# Patient Record
Sex: FEMALE | Race: ASIAN | NOT HISPANIC OR LATINO | Employment: FULL TIME | URBAN - METROPOLITAN AREA
[De-identification: names, ages, dates, MRNs, and addresses within clinical notes are randomized per-mention and may not be internally consistent; named-entity substitution may affect disease eponyms.]

---

## 2017-06-09 ENCOUNTER — HOSPITAL ENCOUNTER (OUTPATIENT)
Dept: RADIOLOGY | Facility: MEDICAL CENTER | Age: 42
End: 2017-06-09
Attending: FAMILY MEDICINE
Payer: COMMERCIAL

## 2017-06-09 DIAGNOSIS — Z12.39 SCREENING BREAST EXAMINATION: ICD-10-CM

## 2017-06-09 PROCEDURE — 74022 RADEX COMPL AQT ABD SERIES: CPT

## 2017-09-28 ENCOUNTER — NON-PROVIDER VISIT (OUTPATIENT)
Dept: OCCUPATIONAL MEDICINE | Facility: CLINIC | Age: 42
End: 2017-09-28

## 2017-09-28 DIAGNOSIS — Z02.89 ENCOUNTER FOR OCCUPATIONAL HEALTH EXAMINATION: ICD-10-CM

## 2017-09-28 DIAGNOSIS — Z02.1 PRE-EMPLOYMENT DRUG SCREENING: ICD-10-CM

## 2017-09-28 LAB
AMP AMPHETAMINE: NORMAL
COC COCAINE: NORMAL
INT CON NEG: NORMAL
INT CON POS: NORMAL
MET METHAMPHETAMINES: NORMAL
OPI OPIATES: NORMAL
PCP PHENCYCLIDINE: NORMAL
POC DRUG COMMENT 753798-OCCUPATIONAL HEALTH: NEGATIVE
THC: NORMAL

## 2017-09-28 PROCEDURE — 80305 DRUG TEST PRSMV DIR OPT OBS: CPT | Performed by: PREVENTIVE MEDICINE

## 2018-02-08 ENCOUNTER — GYNECOLOGY VISIT (OUTPATIENT)
Dept: OBGYN | Facility: MEDICAL CENTER | Age: 43
End: 2018-02-08
Payer: COMMERCIAL

## 2018-02-08 VITALS
SYSTOLIC BLOOD PRESSURE: 120 MMHG | DIASTOLIC BLOOD PRESSURE: 76 MMHG | HEIGHT: 60 IN | BODY MASS INDEX: 22.19 KG/M2 | WEIGHT: 113 LBS

## 2018-02-08 DIAGNOSIS — N93.9 ABNORMAL UTERINE BLEEDING (AUB): ICD-10-CM

## 2018-02-08 PROCEDURE — 99203 OFFICE O/P NEW LOW 30 MIN: CPT | Performed by: OBSTETRICS & GYNECOLOGY

## 2018-02-08 NOTE — PROGRESS NOTES
Chief Complaint   Patient presents with   • Other     Discuss irregular periods.        History of present illness: 42 y.o. presents with above chief complaint. Location:uterus/vaginal bleeding; duration:3-4 months; quality: BRB; severity: moderate,  Timing: off and on but started 10/20/17 for 30 days followed by every 2 weeks in Nov. She had 1 day of menses in December but then started bleeding every day since 1/24/18 and has not stopped; aggravating factors: unknown, alleviating factors:none. Pt also has no other complaints. States she is not tired, having pain, affecting her daily activities except to change pads. She has no FH of female cancers.    Review of systems:  Pertinent positives documented in HPI and a comprehensive review of system is negative as follows:    Constitutional ROS: No unexpected change in weight, No weakness, No fatigue, No unexplained fevers, sweats, or chills  Mouth/Throat ROS: No bleeding gums, No sore throat, No recent change in voice or hoarseness  Neck ROS: No lumps or masses, No swollen glands, No significant pain in neck  Pulmonary ROS: No chronic cough, sputum, or hemoptysis, No dyspnea on exertion, No wheezing, No shortness of breath, No recent change in breathing  Cardiovascular ROS: No exercise intolerance, No chest pain, No shortness of breath, No palpitations, No syncope  Genitourinary ROS: Negative for dysuria, frequency and incontinence  Gastrointestinal ROS: No abdominal pain, No change in bowel habits, No significant change in appetite, No nausea, vomiting, diarrhea, or constipation, No hematemesis, No abdominal bloating or early satiety  Breast ROS: No new breast lumps or masses, No severe breast pain, No nipple discharge  Musculoskeletal/Extremities ROS: No cyanosis, No peripheral edema, No pain, redness or swelling on the joints  Hematologic/Lymphatic ROS: No anemia,  No chills, No bruising, No weight loss  Skin/Integumentary ROS: No evidence of bleeding or bruising,  No abnormal skin lesions noted, No evidence of rash, No itching  Neurologic ROS: No chronic headaches, No seizures, No weakness  Psychiatric ROS: No depression, No anxiety, No psychosis    All PMH, PSH, allergies, social history and FH reviewed and updated today:  History reviewed. No pertinent past medical history.    Past Surgical History:   Procedure Laterality Date   • TUBAL COAGULATION LAPAROSCOPIC BILATERAL  2003 lb        Allergies: No Known Allergies    Social History     Social History   • Marital status:      Spouse name: N/A   • Number of children: N/A   • Years of education: N/A     Occupational History   • Not on file.     Social History Main Topics   • Smoking status: Never Smoker   • Smokeless tobacco: Never Used   • Alcohol use No   • Drug use: No   • Sexual activity: Yes     Partners: Male     Other Topics Concern   • Not on file     Social History Narrative   • No narrative on file       History reviewed. No pertinent family history.    Physical exam:  Blood pressure 120/76, height 1.524 m (5'), weight 51.3 kg (113 lb), last menstrual period 01/24/2018.    GENERAL APPEARANCE: healthy, alert, no distress, cooperative, smiling  NECK nontender, no masses, thyromegaly or nodules  HEART RRR with normal S1 and S2 ,no murmurs, no gallops, no peripheral edema  LUNG clear to auscultation, normal respiratory effort  ABDOMEN Abdomen soft, non-tender. BS normal. No masses,  No organomegaly  FEMALE GYN: normal female external genitalia without lesions, bloody vaginal discharge noted, vulva pink without erythema or friability, urethra is normal without discharge or scarring, no bladder fullness or masses, normal vagina and normal vaginal tone, normal cervix, normal adnexa without tenderness, mildly bulky uterus, normal anus and perineum.  No inguinal hernia present.  EXTREMITIES:negative clubbing, cyanosis, edema    NEURO Awake, alert and oriented x 3, Normal gait, no sensory deficits  SKIN No rashes, or  ulcers or lesions seen  PSYCHIATRIC: Patient shows appropriate affect, is alert and oriented x3, intact judgment and insight.        1. Abnormal uterine bleeding (AUB)  REFERRAL TO OB/GYN    US-GYN-PELVIS TRANSVAGINAL    TSH+PRL+FSH+TESTT+LH+T4F+DH     Discussed possible etiology of bleeding including perimenopause, fibroids and possible malignancy. Will obtain labs and US. Schedule for EMB. Referrals placed. RTO in 3 weeks for EMB.

## 2018-02-10 LAB
DHEA-S SERPL-MCNC: 164.4 UG/DL (ref 57.3–279.2)
FSH SERPL-ACNC: 25.1 MIU/ML
LH SERPL-ACNC: 8.5 MIU/ML
PROLACTIN SERPL-MCNC: 8.8 NG/ML (ref 4.8–23.3)
T4 FREE SERPL-MCNC: 1.13 NG/DL (ref 0.82–1.77)
TESTOST FREE SERPL-MCNC: 0.6 PG/ML (ref 0–4.2)
TESTOST SERPL-MCNC: 7 NG/DL (ref 8–48)
TSH SERPL DL<=0.005 MIU/L-ACNC: 1.3 UIU/ML (ref 0.45–4.5)

## 2018-02-17 ENCOUNTER — HOSPITAL ENCOUNTER (OUTPATIENT)
Dept: RADIOLOGY | Facility: MEDICAL CENTER | Age: 43
End: 2018-02-17
Attending: OBSTETRICS & GYNECOLOGY
Payer: COMMERCIAL

## 2018-02-17 DIAGNOSIS — N93.9 ABNORMAL UTERINE BLEEDING (AUB): ICD-10-CM

## 2018-02-17 PROCEDURE — 76830 TRANSVAGINAL US NON-OB: CPT

## 2018-03-02 ENCOUNTER — HOSPITAL ENCOUNTER (OUTPATIENT)
Facility: MEDICAL CENTER | Age: 43
End: 2018-03-02
Attending: OBSTETRICS & GYNECOLOGY
Payer: COMMERCIAL

## 2018-03-02 ENCOUNTER — GYNECOLOGY VISIT (OUTPATIENT)
Dept: OBGYN | Facility: MEDICAL CENTER | Age: 43
End: 2018-03-02
Payer: COMMERCIAL

## 2018-03-02 VITALS
SYSTOLIC BLOOD PRESSURE: 132 MMHG | DIASTOLIC BLOOD PRESSURE: 70 MMHG | WEIGHT: 116 LBS | BODY MASS INDEX: 22.78 KG/M2 | HEIGHT: 60 IN

## 2018-03-02 DIAGNOSIS — N93.9 ABNORMAL UTERINE BLEEDING (AUB): ICD-10-CM

## 2018-03-02 DIAGNOSIS — Z01.419 WELL WOMAN EXAM WITH ROUTINE GYNECOLOGICAL EXAM: ICD-10-CM

## 2018-03-02 DIAGNOSIS — Z12.4 SCREENING FOR CERVICAL CANCER: ICD-10-CM

## 2018-03-02 DIAGNOSIS — D25.0 FIBROIDS, SUBMUCOSAL: ICD-10-CM

## 2018-03-02 DIAGNOSIS — Z12.39 SCREENING FOR BREAST CANCER: ICD-10-CM

## 2018-03-02 DIAGNOSIS — Z11.51 ENCOUNTER FOR SCREENING FOR HUMAN PAPILLOMAVIRUS (HPV): ICD-10-CM

## 2018-03-02 PROCEDURE — 88175 CYTOPATH C/V AUTO FLUID REDO: CPT

## 2018-03-02 PROCEDURE — 58100 BIOPSY OF UTERUS LINING: CPT | Performed by: OBSTETRICS & GYNECOLOGY

## 2018-03-02 PROCEDURE — 88305 TISSUE EXAM BY PATHOLOGIST: CPT

## 2018-03-02 PROCEDURE — 87624 HPV HI-RISK TYP POOLED RSLT: CPT

## 2018-03-02 PROCEDURE — 99396 PREV VISIT EST AGE 40-64: CPT | Mod: 25 | Performed by: OBSTETRICS & GYNECOLOGY

## 2018-03-02 NOTE — PROGRESS NOTES
Yeni Ozuna is a 42 y.o. y.o. female who presents for her Gynecologic Exam        HPI Comments: Pt presents for well woman exam. Pt had complaints of AUB but states has now stopped. Will still proceed with EMB. Patient's last menstrual period was 01/24/2018.  .  Review of Systems   Pertinent positives documented in HPI and all other systems reviewed & are negative    All PMH, PSH, allergies, social history and FH reviewed and updated today:  History reviewed. No pertinent past medical history.  Past Surgical History:   Procedure Laterality Date   • TUBAL COAGULATION LAPAROSCOPIC BILATERAL  2003 lb      Patient has no known allergies.  Social History     Social History   • Marital status:      Spouse name: N/A   • Number of children: N/A   • Years of education: N/A     Social History Main Topics   • Smoking status: Never Smoker   • Smokeless tobacco: Never Used   • Alcohol use No   • Drug use: No   • Sexual activity: Yes     Partners: Male     Other Topics Concern   • Not on file     Social History Narrative   • No narrative on file     History reviewed. No pertinent family history.  Medications:   No current [facility]-ordered outpatient prescriptions on file.     No current [facility]-ordered facility-administered medications on file.           Objective:   Vital measurements:  Blood pressure 132/70, height 1.524 m (5'), weight 52.6 kg (116 lb), last menstrual period 01/24/2018.  Body mass index is 22.65 kg/m². (Goal BM I>18 <25)    Physical Exam   Nursing note and vitals reviewed.  Constitutional: She is oriented to person, place, and time. She appears well-developed and well-nourished. No distress.     HEENT:   Head: Normocephalic and atraumatic.   Right Ear: External ear normal.   Left Ear: External ear normal.   Nose: Nose normal.   Eyes: Conjunctivae and EOM are normal. Pupils are equal, round, and reactive to light. No scleral icterus.     Neck: Normal range of motion. Neck supple. No tracheal deviation  present. No thyromegaly present.     Pulmonary/Chest: Effort normal and breath sounds normal. No respiratory distress. She has no wheezes. She has no rales. She exhibits no tenderness.     Cardiovascular: Regular, rate and rhythm. No JVD.    Abdominal: Soft. Bowel sounds are normal. She exhibits no distension and no mass. No tenderness. She has no rebound and no guarding.     Breast:  Deferred per pt. States just performed Mammo at [facility] and was negative    Genitourinary:  Pelvic exam was performed with patient supine.  External genitalia with no abnormal pigmentation, labial fusion,rash, tenderness, lesion or injury to the labia bilaterally.  Vagina is moist with no lesions, foul discharge, erythema, tenderness or bleeding. No foreign body around the vagina or signs of injury.   Cervix exhibits no motion tenderness, no discharge and no friability.   Uterus is AV not deviated, not enlarged, not fixed and not tender.  Right adnexum displays no mass, no tenderness and no fullness. Left adnexum displays no mass, no tenderness and no fullness.     Musculoskeletal: Normal range of motion. She exhibits no edema and no tenderness.     Lymphadenopathy: She has no cervical adenopathy.     Neurological: She is alert and oriented to person, place, and time. She exhibits normal muscle tone.     Skin: Skin is warm and dry. No rash noted. She is not diaphoretic. No erythema. No pallor.     Psychiatric: She has a normal mood and affect. Her behavior is normal. Judgment and thought content normal.     Endometrial biopsy    Prior to beginning the procedure, risk and benefits of an endometrial biopsy were discussed including the risk of infection, uterine perforation, and pain. Patient understands the risks associated with endometrial biopsies and consents have been signed to the chart.    Procedure:    The cervix was cleansed with Betadine x3. The uterus was sounded to 7 cm. The single-tooth tenaculum was placed at the 12:00 position.  The endometrial Pipelle was advanced into the uterine cavity without any complications x 2. This sample was sent to pathology. At the site of the tenaculum placement, silver nitrate was used for hemostasis. The patient tolerated the procedure well.            Assessment:     1. Abnormal uterine bleeding (AUB)  Consent for Surgery / Procedure    POCT Pregnancy    PATHOLOGY SPECIMEN   2. Well woman exam with routine gynecological exam  MA-SCREEN MAMMO W/CAD-BILAT    THINPREP PAP WITH HPV   3. Screening for cervical cancer  THINPREP PAP WITH HPV   4. Encounter for screening for human papillomavirus (HPV)  THINPREP PAP WITH HPV   5. Screening for breast cancer  MA-SCREEN MAMMO W/CAD-BILAT   6. Fibroids, submucosal           Plan:   Pap and physical exam performed  Monthly SBE.  Counseling: breast self exam, mammography screening, use and side effects of OCPs, use and side effects of HRT and menopause  Encourage exercise and proper diet.  Mammograms starting @ age 40 annually.  See medications and orders placed in encounter report.    Discussed medical and surgical options for AUB with submucosal fibroid. Pt currently not bleeding but if should start again, will call the office and be started on OCPs. Risks and benefits discussed. EMB performed without problems.

## 2018-03-03 LAB
CYTOLOGY REG CYTOL: NORMAL
HPV HR 12 DNA CVX QL NAA+PROBE: NEGATIVE
HPV16 DNA SPEC QL NAA+PROBE: NEGATIVE
HPV18 DNA SPEC QL NAA+PROBE: NEGATIVE
SPECIMEN SOURCE: NORMAL

## 2018-04-05 ENCOUNTER — OFFICE VISIT (OUTPATIENT)
Dept: MEDICAL GROUP | Facility: MEDICAL CENTER | Age: 43
End: 2018-04-05
Payer: COMMERCIAL

## 2018-04-05 VITALS
TEMPERATURE: 99.3 F | HEIGHT: 63 IN | DIASTOLIC BLOOD PRESSURE: 68 MMHG | HEART RATE: 72 BPM | OXYGEN SATURATION: 98 % | WEIGHT: 113 LBS | SYSTOLIC BLOOD PRESSURE: 108 MMHG | BODY MASS INDEX: 20.02 KG/M2

## 2018-04-05 DIAGNOSIS — K44.9 HIATAL HERNIA: ICD-10-CM

## 2018-04-05 DIAGNOSIS — E55.9 VITAMIN D DEFICIENCY: ICD-10-CM

## 2018-04-05 DIAGNOSIS — E78.2 MIXED HYPERLIPIDEMIA: ICD-10-CM

## 2018-04-05 DIAGNOSIS — Z23 NEED FOR VACCINATION: ICD-10-CM

## 2018-04-05 DIAGNOSIS — Z76.89 ENCOUNTER TO ESTABLISH CARE WITH NEW DOCTOR: ICD-10-CM

## 2018-04-05 DIAGNOSIS — N93.8 DYSFUNCTIONAL UTERINE BLEEDING: ICD-10-CM

## 2018-04-05 DIAGNOSIS — I10 ESSENTIAL HYPERTENSION: ICD-10-CM

## 2018-04-05 DIAGNOSIS — D50.9 MICROCYTIC HYPOCHROMIC ANEMIA: ICD-10-CM

## 2018-04-05 PROBLEM — Z86.2 HISTORY OF ANEMIA: Status: ACTIVE | Noted: 2018-04-05

## 2018-04-05 PROBLEM — E78.1 HYPERTRIGLYCERIDEMIA: Status: ACTIVE | Noted: 2018-04-05

## 2018-04-05 PROCEDURE — 90471 IMMUNIZATION ADMIN: CPT | Performed by: FAMILY MEDICINE

## 2018-04-05 PROCEDURE — 99204 OFFICE O/P NEW MOD 45 MIN: CPT | Mod: 25 | Performed by: FAMILY MEDICINE

## 2018-04-05 PROCEDURE — 90715 TDAP VACCINE 7 YRS/> IM: CPT | Performed by: FAMILY MEDICINE

## 2018-04-05 RX ORDER — ATORVASTATIN CALCIUM 10 MG/1
TABLET, FILM COATED ORAL
COMMUNITY
Start: 2018-02-16 | End: 2018-04-05 | Stop reason: SDUPTHER

## 2018-04-05 RX ORDER — ERGOCALCIFEROL 1.25 MG/1
CAPSULE ORAL
COMMUNITY
Start: 2018-02-21 | End: 2018-04-05

## 2018-04-05 RX ORDER — LISINOPRIL 10 MG/1
TABLET ORAL
COMMUNITY
Start: 2018-02-27 | End: 2018-04-05 | Stop reason: SDUPTHER

## 2018-04-05 RX ORDER — OMEPRAZOLE 20 MG/1
20 CAPSULE, DELAYED RELEASE ORAL 2 TIMES DAILY
Qty: 180 CAP | Refills: 0 | Status: SHIPPED | OUTPATIENT
Start: 2018-04-05 | End: 2018-07-09 | Stop reason: SDUPTHER

## 2018-04-05 RX ORDER — CHOLECALCIFEROL (VITAMIN D3) 125 MCG
2 CAPSULE ORAL DAILY
Qty: 90 TAB | Refills: 1 | Status: SHIPPED | OUTPATIENT
Start: 2018-04-05 | End: 2018-07-09 | Stop reason: SDUPTHER

## 2018-04-05 RX ORDER — LISINOPRIL 10 MG/1
10 TABLET ORAL DAILY
Qty: 90 TAB | Refills: 0 | Status: SHIPPED | OUTPATIENT
Start: 2018-04-05 | End: 2018-10-11

## 2018-04-05 RX ORDER — ATORVASTATIN CALCIUM 10 MG/1
10 TABLET, FILM COATED ORAL DAILY
Qty: 90 TAB | Refills: 0 | Status: SHIPPED | OUTPATIENT
Start: 2018-04-05 | End: 2019-04-11 | Stop reason: SDUPTHER

## 2018-04-05 ASSESSMENT — PATIENT HEALTH QUESTIONNAIRE - PHQ9: CLINICAL INTERPRETATION OF PHQ2 SCORE: 0

## 2018-04-15 NOTE — ASSESSMENT & PLAN NOTE
Chronic, stable, well controlled. It is unclear how patient is taking her blood pressure medication. Apparently, she was told by her former PCP takes blood pressure medication (lisinopril) sees on an as-needed basis.    No signs or symptoms of angioedema present time O (this swelling, throat closure, wheezing, respiratory distress.    ROS is NEGATIVE for dizziness, generalized weakness/fatigue, cold sweats, dizziness,  vision/hearing changes, jaw pain/paresthesias, BUE pain/paresthesias/numbness/weakness, chest pain/pressure, palpitations, dyspnea, nausea, RUQ abdominal pain, oliguria/anuria, BLE edema.

## 2018-04-15 NOTE — PROGRESS NOTES
Subjective:   Chief Complaint/History of Present Illness:  Yeni Glasgow is a 42 y.o. female patient new to [facility] who presents today to establish primary medical care and to discuss the following medical conditions.      Diagnoses of Encounter to establish care with new doctor, Microcytic hypochromic anemia, Dysfunctional uterine bleeding, Essential hypertension, Mixed hyperlipidemia, Hiatal hernia, Vitamin D deficiency, and Need for vaccination were pertinent to this visit.    PMH, PSH, Social History, Medications, Allergies, FMH all reviewed as documented:    Microcytic hypochromic anemia  Acute exacerbation of chronic condition. Review of records from February 2018 from lab core reveals that patient was diagnosed with microcytic hypochromic anemia.    Patient has had appropriate follow-up with gynecologist, Dr. Barksdale, with both Pap smear (negative for intraepithelial lesions, negative for HPV testing) and endometrial biopsy (positive for: Disordered proliferative endometrium with focal tubal metaplasia).    Dysfunctional uterine bleeding has since stopped, and patient does not feel symptomatic anymore.    ROS is NEGATIVE for generalized weakness/fatigue, generalized pallor, dizziness, lightheadedness, blurred vision, chest pain/pressure, palpitations, tachycardia, dyspnea, hematemesis, hemoptysis, diarrhea, hematochezia, melena, hematuria, dysfunctional vaginal bleeding, hand and foot tingling.    Dysfunctional uterine bleeding  Chronic, uncontrolled, please see notes from same date of service 4/5/2018 Re: Microcytic hyperchromic anemia.    Essential hypertension  Chronic, stable, well controlled. It is unclear how patient is taking her blood pressure medication. Apparently, she was told by her former PCP takes blood pressure medication (lisinopril) sees on an as-needed basis.    No signs or symptoms of angioedema present time O (this swelling, throat closure, wheezing, respiratory distress.    ROS is  NEGATIVE for dizziness, generalized weakness/fatigue, cold sweats, dizziness,  vision/hearing changes, jaw pain/paresthesias, BUE pain/paresthesias/numbness/weakness, chest pain/pressure, palpitations, dyspnea, nausea, RUQ abdominal pain, oliguria/anuria, BLE edema.    Mixed hyperlipidemia  Patient reports having history of mixed hyperlipidemia (see last labs from 2010 as below). Patient is currently taking atorvastatin 10 mg by mouth daily.    ROS negative for myalgias, arthralgias.      Component Value Ref Range & Units Status   Cholesterol,Tot 260   100 - 199 mg/dL Final   Triglycerides 163   0 - 149 mg/dL Final   HDL 61  >39 mg/dL Final   Comment:   According to ATP-III Guidelines, HDL-C >59 mg/dL is considered a  negative risk factor for CHD.   VLDL Cholesterol Calc 33  5 - 40 mg/dL Final      0 - 99 mg/dL Final       Hiatal hernia  Patient reported hiatal hernia, states that this is causing her to have esophageal reflux symptoms. The symptoms are generally well-controlled with Prilosec as she is taking on a regular basis.    ROS negative for fevers, chills, nausea, emesis, hematemesis, hematochezia, melena.    Vitamin D deficiency  Patient reports have history of vitamin D deficiency. She is currently taking vitamin D as listed. Patient without pathologic fractures nor falls risk.      Patient Active Problem List    Diagnosis Date Noted   • Microcytic hypochromic anemia 04/05/2018   • Mixed hyperlipidemia 04/05/2018   • Vitamin D deficiency 04/05/2018   • Dysfunctional uterine bleeding 04/05/2018   • Hiatal hernia 04/05/2018   • Essential hypertension 04/05/2018       Additional History:   Allergies:    Patient has no known allergies.     Medications:     Current Outpatient Prescriptions Ordered in [facility]   Medication Sig Dispense Refill   • Misc Natural Products (FOCUSED MIND PO) Take  by mouth.     • lisinopril (PRINIVIL) 10 MG Tab Take 1 Tab by mouth every day. 90 Tab 0   • atorvastatin (LIPITOR) 10 MG  "Tab Take 1 Tab by mouth every day. 90 Tab 0   • Cholecalciferol (VITAMIN D3) 2000 units Tab Take 2 tablet by mouth every day for 180 days. 90 Tab 1   • omeprazole (PRILOSEC) 20 MG delayed-release capsule Take 1 Cap by mouth 2 times a day. 180 Cap 0     No current Epic-ordered facility-administered medications on file.         Past Medical History:   History reviewed. No pertinent past medical history.     Past Surgical History:     Past Surgical History:   Procedure Laterality Date   • TUBAL COAGULATION LAPAROSCOPIC BILATERAL  2003 lb         Social History:     Social History   Substance Use Topics   • Smoking status: Never Smoker   • Smokeless tobacco: Never Used   • Alcohol use No        Family History:     Family Status   Relation Status   • Mother Alive   • Father Alive   • Sister Alive   • Brother Alive   • Brother Alive   • Sister Alive      History reviewed. No pertinent family history.    ROS:     - Constitutional: Negative for fever, chills, unexpected weight change, and fatigue/generalized weakness.     - Respiratory: Negative for cough, sputum production, chest congestion, dyspnea, wheezing, and crackles.      - Cardiovascular: Negative for chest pain, palpitations, orthopnea, and bilateral lower extremity edema.     - NOTE: All other systems reviewed and are negative, except as in HPI.     Objective:   Physical Exam:    Vitals: Blood pressure 108/68, pulse 72, temperature 37.4 °C (99.3 °F), height 1.6 m (5' 3\"), weight 51.3 kg (113 lb), last menstrual period 03/14/2018, SpO2 98 %, not currently breastfeeding.   BMI: Body mass index is 20.02 kg/m².   General/Constitutional: Vitals as above, Well nourished, well developed female in no acute distress   Head/Eyes:  - Head is grossly normal & atraumatic  - Bilateral conjunctivae clear and not injected, bilateral EOMI, bilateral PERRL   ENT:   - Bilateral external ears grossly normal in appearance, external auditory canals clear & bilateral TMs visualized " with appropriate cone of light reflex, hearing grossly intact  - External nares normal in appearance and without discharge/bleeding, bilateral turbinates non-erythematous/non-edematous and without discharge/bleeding  - Good dentition posterior oropharynx without erythema/edema/exudates  Neck: Neck supple, no masses, neck non-tender to palpation, no thyromegaly/goiter   Respiratory: No respiratory distress, bilateral lungs are clear to auscultation in all lung fields (anterior/lateral/posterior), no wheezing/rhonchi/rales   Cardiovascular: Regular rate and rhythm without murmur/gallops/rubs, carotid bruits present, distal pulses equal and 2+ bilaterally (radial, posterior tibial), no bilateral lower extremity edema   Gastrointestinal: Abdominal scars present, Abdomen resonant to percussion, Bowel sounds present in all 4 quadrants, abdomen mildly tender to light and deep palpation    MSK: Gait grossly normal & not antalgic, no tenderness to percussion of vertebral processes, no CVAT, no bilateral SI joint tenderness   Integumentary: No apparent rashes   Neuro: Gross motor movement intact in all 4 extremities, Gross sensation intact to extremities and trunk, Gait grossly normal and not antalgic   Psych: Judgment grossly appropriate, no apparent depression/anxiety    Health Maintenance:     - I have requested previous records (Dr. Giovanny Greer), and will update accordingly.    Imaging/Labs:     - I have requested previous records (DR. Giovanny Greer), and will update accordingly.    Assessment and Plan:   1. Encounter to establish care with new doctor  Patient establishing primary medical care with me, transferring from Dr. Giovanny Greer    2. Microcytic hypochromic anemia  3. Dysfunctional uterine bleeding  Chronic, controlled, currently asymptomatic. Patient's workup was negative for malignancy or dysplasia/metaplasia that was significant. We will continue to track her CBC along with iron levels, and patient is advised to  follow-up with gynecologist ASAP for further discussion/consultation/management.   - CBC WITH DIFFERENTIAL; Future   - FERRITIN; Future   - IRON/TOTAL IRON BIND; Future    4. Essential hypertension  Chronic, stable, controlled. Patient advised to continue lisinopril and daily basis, however to stop if she is having hypertensive symptoms.   - lisinopril (PRINIVIL) 10 MG Tab; Take 1 Tab by mouth every day.  Dispense: 90 Tab; Refill: 0    5. Mixed hyperlipidemia  Chronic, unknown control, evaluate his lipid profile, but continue atorvastatin at current dosage.   - LIPID PROFILE; Future   - atorvastatin (LIPITOR) 10 MG Tab; Take 1 Tab by mouth every day.  Dispense: 90 Tab; Refill: 0    6. Hiatal hernia  Chronic, stable, well controlled on current medication.   - omeprazole (PRILOSEC) 20 MG delayed-release capsule; Take 1 Cap by mouth 2 times a day.  Dispense: 180 Cap; Refill: 0    7. Vitamin D deficiency  Chronic, unknown control, evaluate vitamin D lab, continue vitamin D supplementation as directed.   - VITAMIN D,25 HYDROXY; Future   - Cholecalciferol (VITAMIN D3) 2000 units Tab; Take 2 tablet by mouth every day for 180 days.  Dispense: 90 Tab; Refill: 1    8. Need for vaccination  Uncontrolled, addressed with vaccine as ordered below.   - TDAP VACCINE =>8YO IM    RTC: In 3-6 months for annual medical exam, review of labs and records.    PLEASE NOTE: This dictation was created using voice recognition software. I have made every reasonable attempt to correct obvious errors, but I expect that there are errors of grammar and possibly content that I did not discover before finalizing the note.

## 2018-04-15 NOTE — ASSESSMENT & PLAN NOTE
Patient reports have history of vitamin D deficiency. She is currently taking vitamin D as listed. Patient without pathologic fractures nor falls risk.

## 2018-04-15 NOTE — ASSESSMENT & PLAN NOTE
Acute exacerbation of chronic condition. Review of records from February 2018 from lab core reveals that patient was diagnosed with microcytic hypochromic anemia.    Patient has had appropriate follow-up with gynecologist, Dr. Barksdale, with both Pap smear (negative for intraepithelial lesions, negative for HPV testing) and endometrial biopsy (positive for: Disordered proliferative endometrium with focal tubal metaplasia).    Dysfunctional uterine bleeding has since stopped, and patient does not feel symptomatic anymore.    ROS is NEGATIVE for generalized weakness/fatigue, generalized pallor, dizziness, lightheadedness, blurred vision, chest pain/pressure, palpitations, tachycardia, dyspnea, hematemesis, hemoptysis, diarrhea, hematochezia, melena, hematuria, dysfunctional vaginal bleeding, hand and foot tingling.

## 2018-04-15 NOTE — ASSESSMENT & PLAN NOTE
Chronic, uncontrolled, please see notes from same date of service 4/5/2018 Re: Microcytic hyperchromic anemia.

## 2018-04-15 NOTE — ASSESSMENT & PLAN NOTE
Patient reported hiatal hernia, states that this is causing her to have esophageal reflux symptoms. The symptoms are generally well-controlled with Prilosec as she is taking on a regular basis.    ROS negative for fevers, chills, nausea, emesis, hematemesis, hematochezia, melena.

## 2018-04-15 NOTE — ASSESSMENT & PLAN NOTE
Patient reports having history of mixed hyperlipidemia (see last labs from 2010 as below). Patient is currently taking atorvastatin 10 mg by mouth daily.    ROS negative for myalgias, arthralgias.      Component Value Ref Range & Units Status   Cholesterol,Tot 260   100 - 199 mg/dL Final   Triglycerides 163   0 - 149 mg/dL Final   HDL 61  >39 mg/dL Final   Comment:   According to ATP-III Guidelines, HDL-C >59 mg/dL is considered a  negative risk factor for CHD.   VLDL Cholesterol Calc 33  5 - 40 mg/dL Final      0 - 99 mg/dL Final

## 2018-07-09 DIAGNOSIS — K44.9 HIATAL HERNIA: ICD-10-CM

## 2018-07-09 DIAGNOSIS — E55.9 VITAMIN D DEFICIENCY: ICD-10-CM

## 2018-07-09 RX ORDER — OMEPRAZOLE 20 MG/1
20 CAPSULE, DELAYED RELEASE ORAL 2 TIMES DAILY
Qty: 180 CAP | Refills: 0 | Status: SHIPPED | OUTPATIENT
Start: 2018-07-09 | End: 2018-10-14 | Stop reason: SDUPTHER

## 2018-07-09 RX ORDER — CHOLECALCIFEROL (VITAMIN D3) 50 MCG
2000 TABLET ORAL DAILY
Qty: 90 TAB | Refills: 0 | Status: SHIPPED | OUTPATIENT
Start: 2018-07-09 | End: 2019-01-05

## 2018-10-06 LAB
25(OH)D3+25(OH)D2 SERPL-MCNC: 41.5 NG/ML (ref 30–100)
BASOPHILS # BLD AUTO: 0.1 X10E3/UL (ref 0–0.2)
BASOPHILS NFR BLD AUTO: 1 %
CHOLEST SERPL-MCNC: 197 MG/DL (ref 100–199)
EOSINOPHIL # BLD AUTO: 0.3 X10E3/UL (ref 0–0.4)
EOSINOPHIL NFR BLD AUTO: 5 %
ERYTHROCYTE [DISTWIDTH] IN BLOOD BY AUTOMATED COUNT: 13.8 % (ref 12.3–15.4)
FERRITIN SERPL-MCNC: 9 NG/ML (ref 15–150)
HCT VFR BLD AUTO: 37.3 % (ref 34–46.6)
HDLC SERPL-MCNC: 63 MG/DL
HGB BLD-MCNC: 11.7 G/DL (ref 11.1–15.9)
IMM GRANULOCYTES # BLD: 0 X10E3/UL (ref 0–0.1)
IMM GRANULOCYTES NFR BLD: 0 %
IMMATURE CELLS  115398: ABNORMAL
IRON SATN MFR SERPL: 5 % (ref 15–55)
IRON SERPL-MCNC: 21 UG/DL (ref 27–159)
LABORATORY COMMENT REPORT: ABNORMAL
LDLC SERPL CALC-MCNC: 105 MG/DL (ref 0–99)
LYMPHOCYTES # BLD AUTO: 1.6 X10E3/UL (ref 0.7–3.1)
LYMPHOCYTES NFR BLD AUTO: 27 %
MCH RBC QN AUTO: 28.7 PG (ref 26.6–33)
MCHC RBC AUTO-ENTMCNC: 31.4 G/DL (ref 31.5–35.7)
MCV RBC AUTO: 91 FL (ref 79–97)
MONOCYTES # BLD AUTO: 0.4 X10E3/UL (ref 0.1–0.9)
MONOCYTES NFR BLD AUTO: 7 %
MORPHOLOGY BLD-IMP: ABNORMAL
NEUTROPHILS # BLD AUTO: 3.6 X10E3/UL (ref 1.4–7)
NEUTROPHILS NFR BLD AUTO: 60 %
NRBC BLD AUTO-RTO: ABNORMAL %
PLATELET # BLD AUTO: 401 X10E3/UL (ref 150–379)
RBC # BLD AUTO: 4.08 X10E6/UL (ref 3.77–5.28)
TIBC SERPL-MCNC: 435 UG/DL (ref 250–450)
TRIGL SERPL-MCNC: 143 MG/DL (ref 0–149)
UIBC SERPL-MCNC: 414 UG/DL (ref 131–425)
VLDLC SERPL CALC-MCNC: 29 MG/DL (ref 5–40)
WBC # BLD AUTO: 6 X10E3/UL (ref 3.4–10.8)

## 2018-10-11 ENCOUNTER — OFFICE VISIT (OUTPATIENT)
Dept: MEDICAL GROUP | Facility: MEDICAL CENTER | Age: 43
End: 2018-10-11
Payer: COMMERCIAL

## 2018-10-11 VITALS
BODY MASS INDEX: 21.23 KG/M2 | SYSTOLIC BLOOD PRESSURE: 114 MMHG | DIASTOLIC BLOOD PRESSURE: 72 MMHG | TEMPERATURE: 98.5 F | WEIGHT: 119.8 LBS | HEIGHT: 63 IN | OXYGEN SATURATION: 100 % | HEART RATE: 73 BPM

## 2018-10-11 DIAGNOSIS — N93.8 DYSFUNCTIONAL UTERINE BLEEDING: ICD-10-CM

## 2018-10-11 DIAGNOSIS — I10 ESSENTIAL HYPERTENSION: ICD-10-CM

## 2018-10-11 DIAGNOSIS — Z00.00 ANNUAL PHYSICAL EXAM: Primary | ICD-10-CM

## 2018-10-11 DIAGNOSIS — R05.8 ACE-INHIBITOR COUGH: ICD-10-CM

## 2018-10-11 DIAGNOSIS — Z23 NEED FOR INFLUENZA VACCINATION: ICD-10-CM

## 2018-10-11 DIAGNOSIS — T46.4X5A ACE-INHIBITOR COUGH: ICD-10-CM

## 2018-10-11 DIAGNOSIS — E61.1 IRON DEFICIENCY: ICD-10-CM

## 2018-10-11 DIAGNOSIS — D25.1 INTRAMURAL LEIOMYOMA OF UTERUS: ICD-10-CM

## 2018-10-11 PROBLEM — D25.9 UTERINE FIBROID: Status: ACTIVE | Noted: 2018-10-11

## 2018-10-11 PROCEDURE — 99214 OFFICE O/P EST MOD 30 MIN: CPT | Mod: 25 | Performed by: FAMILY MEDICINE

## 2018-10-11 PROCEDURE — 99396 PREV VISIT EST AGE 40-64: CPT | Performed by: FAMILY MEDICINE

## 2018-10-11 RX ORDER — LOSARTAN POTASSIUM 25 MG/1
25 TABLET ORAL DAILY
Qty: 90 TAB | Refills: 0 | Status: SHIPPED | OUTPATIENT
Start: 2018-10-11 | End: 2019-01-18 | Stop reason: SDUPTHER

## 2018-10-13 NOTE — ASSESSMENT & PLAN NOTE
New problem, uncontrolled, please see notes from same day service 10/11/2018 regarding hypertension

## 2018-10-13 NOTE — PROGRESS NOTES
Subjective:   Chief Complaint/History of Present Illness:  Yeni Glasgow is a 43 y.o. female established who presents today for Annual Medical exam, to review the following medical problems.      The primary encounter diagnosis was Annual physical exam. Diagnoses of Iron deficiency, Dysfunctional uterine bleeding, Essential hypertension, ACE-inhibitor cough, Intramural leiomyoma of uterus, and Need for influenza vaccination were also pertinent to this visit.    PMH, PSH, Social History, Medications, Allergies, FMH all reviewed as documented:    Iron deficiency  Acute exacerbation of chronic condition, uncontrolled, interestingly, patient does not have signs or symptoms of symptomatic anemia, however does admit to having heavy vaginal pain during her periods that are occurring irregularly.    ROS is NEGATIVE for generalized weakness/fatigue, generalized pallor, dizziness, lightheadedness, blurred vision, chest pain/pressure, palpitations, tachycardia, dyspnea, hematemesis, hemoptysis, diarrhea, hematochezia, melena, hematuria, hand and foot tingling.     Dysfunctional uterine bleeding  Chronic, uncontrolled, please see notes from same date of service 10/11/2018 regarding iron deficiency.    Essential hypertension  Chronic, stable, well-controlled, taking medication as directed.  Patient is requesting medication change as she is having a chronic nonproductive cough that she can explain is not related to URI otherwise.    No signs of angioedema (lip swelling, sensation of throat closure, airway compromise, wheezing, respiratory distress, hives, facial swelling).    ROS is NEGATIVE for dizziness, generalized weakness/fatigue, cold sweats,  vision/hearing changes, jaw pain/paresthesias, BUE pain/paresthesias/numbness/weakness, chest pain/pressure, palpitations, dyspnea, nausea, RUQ abdominal pain, oliguria/anuria, BLE edema.    ROS is NEGATIVE for fevers, chills, bilateral ear congestion/pain, sinus headaches,  cough, tender cervical lymph nodes, dysphagia, tachypnea, dyspnea, chest congestion, wheezing/rhonchi/rales, nausea, emesis, loose stools/diarrhea, myalgias, rash.     ACE-inhibitor cough  New problem, uncontrolled, please see notes from same day service 10/11/2018 regarding hypertension    Intramural leiomyoma of uterus  Chronic, uncontrolled, patient instructed to reestablish primary well woman care with gynecologist.      Patient Active Problem List    Diagnosis Date Noted   • Intramural leiomyoma of uterus 10/11/2018   • ACE-inhibitor cough 10/11/2018   • Iron deficiency 04/05/2018   • Mixed hyperlipidemia 04/05/2018   • Vitamin D deficiency 04/05/2018   • Dysfunctional uterine bleeding 04/05/2018   • Hiatal hernia 04/05/2018   • Essential hypertension 04/05/2018       Additional History:   Allergies:    Patient has no known allergies.     Medications:     Current Outpatient Prescriptions Ordered in [facility]   Medication Sig Dispense Refill   • losartan (COZAAR) 25 MG Tab Take 1 Tab by mouth every day. 90 Tab 0   • Cholecalciferol (VITAMIN D) 2000 UNIT Tab Take 1 Tab by mouth every day for 180 days. 90 Tab 0   • omeprazole (PRILOSEC) 20 MG delayed-release capsule TAKE 1 CAP BY MOUTH 2 TIMES A DAY. 180 Cap 0   • Misc Natural Products (FOCUSED MIND PO) Take  by mouth.     • atorvastatin (LIPITOR) 10 MG Tab Take 1 Tab by mouth every day. 90 Tab 0     No current Epic-ordered facility-administered medications on file.         Past Medical History:   No past medical history on file.     Past Surgical History:     Past Surgical History:   Procedure Laterality Date   • TUBAL COAGULATION LAPAROSCOPIC BILATERAL  2003 lb         Social History:     Social History   Substance Use Topics   • Smoking status: Never Smoker   • Smokeless tobacco: Never Used   • Alcohol use No        Family History:     Family Status   Relation Status   • Mo Alive   • Fa Alive   • Sis Alive   • Bro Alive   • Bro Alive   • Sis Alive   • Neg Hx (Not  "Specified)        Family History   Problem Relation Age of Onset   • Cancer Neg Hx        ROS:     - NOTE: All other systems reviewed and are negative, except as in HPI.     Objective:   Physical Exam:    Vitals: Blood pressure 114/72, pulse 73, temperature 36.9 °C (98.5 °F), height 1.6 m (5' 2.99\"), weight 54.3 kg (119 lb 12.8 oz), SpO2 100 %, not currently breastfeeding.   BMI: Body mass index is 21.23 kg/m².   General/Constitutional: Vitals as above, Well nourished, well developed female in no acute distress   Head/Eyes: Head is grossly normal & atraumatic, bilateral conjunctivae clear and not injected, bilateral EOMI, bilateral PERRL   ENT: Bilateral external ears grossly normal in appearance, Hearing grossly intact, External nares normal in appearance and without discharge/bleeding   Respiratory: No respiratory distress, bilateral lungs are clear to ausculation in all lung fields (anterior/lateral/posterior), no wheezing/rhonchi/rales   Cardiovascular: Regular rate and rhythm without murmur/gallops/rubs, distal pulses are intact and equal bilaterally (radial, posterior tibial), no bilateral lower extremity edema   MSK: Gait grossly normal & not antalgic   Integumentary: No apparent rashes   Psych: Judgment grossly appropriate, no apparent depression/anxiety    Health Maintenance:     - Reviewed and found to be up-to-date    Imaging/Labs:     - 10/05/18 -- mild ldl elevation (cholesterol levels improved alot), low Fe, Ferritin and %saturation, TIBC WNL    Assessment and Plan:   1. Annual physical exam  Chronic, stable, well-controlled at present time.  Patient in good health.    2. Iron deficiency  Chronic, uncontrolled by labs, however asymptomatic at present time.  Referral to OB/GYN for further evaluation and management of dysfunctional uterine bleeding as well as intramural myoma of uterus   - REFERRAL TO OB/GYN    3. Dysfunctional uterine bleeding  4. Intramural leiomyoma of uterus  Chronic, uncontrolled, " #3 is likely secondary to #4.  Patient asked to follow-up with OB/GYN for further evaluation and management.   - REFERRAL TO OB/GYN    5. Essential hypertension  6. ACE-inhibitor cough  Chronic, stable, well-controlled.  Change medication from ACE inhibitor to ARB as directed.    - losartan (COZAAR) 25 MG Tab; Take 1 Tab by mouth every day.  Dispense: 90 Tab; Refill: 0    7. Need for influenza vaccination  Declines      RTC: .    PLEASE NOTE: This dictation was created using voice recognition software. I have made every reasonable attempt to correct obvious errors, but I expect that there are errors of grammar and possibly content that I did not discover before finalizing the note.

## 2018-10-13 NOTE — ASSESSMENT & PLAN NOTE
Acute exacerbation of chronic condition, uncontrolled, interestingly, patient does not have signs or symptoms of symptomatic anemia, however does admit to having heavy vaginal pain during her periods that are occurring irregularly.    ROS is NEGATIVE for generalized weakness/fatigue, generalized pallor, dizziness, lightheadedness, blurred vision, chest pain/pressure, palpitations, tachycardia, dyspnea, hematemesis, hemoptysis, diarrhea, hematochezia, melena, hematuria, hand and foot tingling.

## 2018-10-13 NOTE — ASSESSMENT & PLAN NOTE
Chronic, uncontrolled, patient instructed to reestablish primary well woman care with gynecologist.

## 2018-10-13 NOTE — ASSESSMENT & PLAN NOTE
Chronic, uncontrolled, please see notes from same date of service 10/11/2018 regarding iron deficiency.

## 2018-10-14 DIAGNOSIS — K44.9 HIATAL HERNIA: ICD-10-CM

## 2018-10-15 RX ORDER — OMEPRAZOLE 20 MG/1
CAPSULE, DELAYED RELEASE ORAL
Qty: 180 CAP | Refills: 0 | Status: SHIPPED | OUTPATIENT
Start: 2018-10-15 | End: 2019-04-11 | Stop reason: SDUPTHER

## 2018-12-06 DIAGNOSIS — I10 ESSENTIAL HYPERTENSION: ICD-10-CM

## 2018-12-06 RX ORDER — LISINOPRIL 10 MG/1
10 TABLET ORAL DAILY
Qty: 90 TAB | Refills: 1 | OUTPATIENT
Start: 2018-12-06

## 2018-12-06 NOTE — TELEPHONE ENCOUNTER
Received prescription refill request for an ACE inhibitor.  Patient is ready on losartan.  Therefore, patient should not be on both.  I think this was an error, and I am trying to confirm with patient via my chart.

## 2019-01-18 DIAGNOSIS — I10 ESSENTIAL HYPERTENSION: ICD-10-CM

## 2019-01-20 RX ORDER — LOSARTAN POTASSIUM 25 MG/1
TABLET ORAL
Qty: 90 TAB | Refills: 0 | Status: SHIPPED | OUTPATIENT
Start: 2019-01-20 | End: 2019-04-11 | Stop reason: SDUPTHER

## 2019-02-25 ENCOUNTER — TELEPHONE (OUTPATIENT)
Dept: MEDICAL GROUP | Facility: MEDICAL CENTER | Age: 44
End: 2019-02-25

## 2019-02-25 NOTE — TELEPHONE ENCOUNTER
VOICEMAIL  1. Caller Name: Yeni Glasgow                        Call Back Number: [phone] (home)       2. Message: Pt would like to have labs ordered and have them printed so she can pick them up.     3. Patient approves office to leave a detailed voicemail/MyChart message: N\A

## 2019-02-26 NOTE — TELEPHONE ENCOUNTER
For which condition is she requesting labs?  It appears that her previous set of labs looked pretty good (10/2018).      Also, If she's asking for labs about anemia or iron deficiency, she was supposed to establish gynecologic care with Dr. Barksdale (at her new office on [address]) to discuss those conditions.  Did she do so?

## 2019-04-11 ENCOUNTER — OFFICE VISIT (OUTPATIENT)
Dept: MEDICAL GROUP | Facility: MEDICAL CENTER | Age: 44
End: 2019-04-11
Payer: COMMERCIAL

## 2019-04-11 VITALS
SYSTOLIC BLOOD PRESSURE: 102 MMHG | TEMPERATURE: 98 F | HEART RATE: 61 BPM | OXYGEN SATURATION: 95 % | DIASTOLIC BLOOD PRESSURE: 62 MMHG | WEIGHT: 118.83 LBS | HEIGHT: 63 IN | BODY MASS INDEX: 21.05 KG/M2

## 2019-04-11 DIAGNOSIS — Z13.1 DIABETES MELLITUS SCREENING: ICD-10-CM

## 2019-04-11 DIAGNOSIS — Z00.00 ANNUAL PHYSICAL EXAM: ICD-10-CM

## 2019-04-11 DIAGNOSIS — K44.9 HIATAL HERNIA: ICD-10-CM

## 2019-04-11 DIAGNOSIS — K22.4 ESOPHAGOSPASM: ICD-10-CM

## 2019-04-11 DIAGNOSIS — E78.2 MIXED HYPERLIPIDEMIA: ICD-10-CM

## 2019-04-11 DIAGNOSIS — I10 ESSENTIAL HYPERTENSION: ICD-10-CM

## 2019-04-11 DIAGNOSIS — E55.9 VITAMIN D DEFICIENCY: ICD-10-CM

## 2019-04-11 DIAGNOSIS — K21.00 GASTROESOPHAGEAL REFLUX DISEASE WITH ESOPHAGITIS: ICD-10-CM

## 2019-04-11 DIAGNOSIS — E61.1 IRON DEFICIENCY: ICD-10-CM

## 2019-04-11 PROCEDURE — 99214 OFFICE O/P EST MOD 30 MIN: CPT | Performed by: FAMILY MEDICINE

## 2019-04-11 RX ORDER — ATORVASTATIN CALCIUM 10 MG/1
10 TABLET, FILM COATED ORAL DAILY
Qty: 90 TAB | Refills: 0 | Status: SHIPPED | OUTPATIENT
Start: 2019-04-11 | End: 2019-05-10 | Stop reason: SDUPTHER

## 2019-04-11 RX ORDER — OMEPRAZOLE 20 MG/1
20 CAPSULE, DELAYED RELEASE ORAL 2 TIMES DAILY
Qty: 180 CAP | Refills: 0 | Status: SHIPPED | OUTPATIENT
Start: 2019-04-11 | End: 2019-05-10 | Stop reason: SDUPTHER

## 2019-04-11 RX ORDER — LOSARTAN POTASSIUM 25 MG/1
25 TABLET ORAL
Qty: 90 TAB | Refills: 0 | Status: SHIPPED | OUTPATIENT
Start: 2019-04-11 | End: 2019-05-10 | Stop reason: SDUPTHER

## 2019-04-11 NOTE — ASSESSMENT & PLAN NOTE
New problem, uncontrolled, patient with chronic throat tickling/cough, also with worsening of her acid reflux.  Patient is not taking omeprazole on a daily basis.  Therefore, patient is recommended to start taking on a daily basis, and she will also be referred to gastroenterology for further evaluation and management.    ROS is negative for hematemesis, emesis, abdominal pain, loose stools.

## 2019-04-11 NOTE — ASSESSMENT & PLAN NOTE
Chronic, stable, well-controlled, taking medication as directed.     ROS is NEGATIVE for dizziness, generalized weakness/fatigue, cold sweats,  vision/hearing changes, jaw pain/paresthesias, BUE pain/paresthesias/numbness/weakness, chest pain/pressure, palpitations, dyspnea, nausea, BLE edema.

## 2019-04-11 NOTE — LETTER
[facility]  Marbin Long M.D.  [address]  [place] [address]  Fax: [phone]   Authorization for Release/Disclosure of   Protected Health Information   Name: OCHOA FATIMA : 1975 SSN: [id number]   Address: [address] [facility] [address] Phone:    [phone] (home)    I authorize the entity listed below to release/disclose the PHI below to:   [facility]/Marbin Long M.D. and Marbin Long M.D.   Provider or Entity Name:  Dr. Melvi Barksdale   Address   City, [facility]   Phone:      Fax:     Reason for request: continuity of care   Information to be released:    [  ] LAST COLONOSCOPY,  including any PATH REPORT and follow-up  [  ] LAST FIT/COLOGUARD RESULT [  ] LAST DEXA  [ ] LAST MAMMOGRAM  [X] LAST PAP  [  ] LAST LABS [  ] RETINA EXAM REPORT  [  ] IMMUNIZATION RECORDS  [X] Release all info -- Annual Well Woman Exam      [  ] Check here and initial the line next to each item to release ALL health information INCLUDING  _____ Care and treatment for drug and / or alcohol abuse  _____ HIV testing, infection status, or AIDS  _____ Genetic Testing    DATES OF SERVICE OR TIME PERIOD TO BE DISCLOSED: _____________  I understand and acknowledge that:  * This Authorization may be revoked at any time by you in writing, except if your health information has already been used or disclosed.  * Your health information that will be used or disclosed as a result of you signing this authorization could be re-disclosed by the recipient. If this occurs, your re-disclosed health information may no longer be protected by State or Federal laws.  * You may refuse to sign this Authorization. Your refusal will not affect your ability to obtain treatment.  * This Authorization becomes effective upon signing and will  on (date) __________.      If no date is indicated, this Authorization will  one (1) year from the signature date.    Name: Ochoa Fatima    Signature:   Date:     4/11/2019       PLEASE FAX REQUESTED RECORDS BACK TO: [phone]

## 2019-04-11 NOTE — PROGRESS NOTES
Subjective:   Chief Complaint/History of Present Illness:  Yeni Glasgow is a 43 y.o. female established patient who presents today to discuss medical problems as listed below    Diagnoses of Esophagospasm, Gastroesophageal reflux disease with esophagitis, Hiatal hernia, Essential hypertension, Mixed hyperlipidemia, Iron deficiency, Vitamin D deficiency, Annual physical exam, and Diabetes mellitus screening were pertinent to this visit.    Esophagospasm  New problem, uncontrolled, patient with chronic throat tickling/cough, also with worsening of her acid reflux.  Patient is not taking omeprazole on a daily basis.  Therefore, patient is recommended to start taking on a daily basis, and she will also be referred to gastroenterology for further evaluation and management.    ROS is negative for hematemesis, emesis, abdominal pain, loose stools.    Gastroesophageal reflux disease with esophagitis  Chronic, uncontrolled, please see notes from same date of service 4/11/2019 regarding esophagospasm.    Hiatal hernia  Chronic, uncontrolled, please see notes from same date of service 4/11/2019 regarding esophagospasm.    Essential hypertension  Chronic, stable, well-controlled, taking medication as directed.     ROS is NEGATIVE for dizziness, generalized weakness/fatigue, cold sweats,  vision/hearing changes, jaw pain/paresthesias, BUE pain/paresthesias/numbness/weakness, chest pain/pressure, palpitations, dyspnea, nausea, BLE edema.     Mixed hyperlipidemia  Patient and I discussed recent labs (see below; mixed dyslipidemia, uncontrolled) and that new labs are necessary to re-evaluate.    Lab Results   Component Value Date/Time    CHOLSTRLTOT 197 10/05/2018 10:08 AM    CHOLSTRLTOT 260 (H) 09/27/2010 07:15 AM     (H) 10/05/2018 10:08 AM     (H) 09/27/2010 07:15 AM    HDL 63 10/05/2018 10:08 AM    HDL 61 09/27/2010 07:15 AM    TRIGLYCERIDE 143 10/05/2018 10:08 AM    TRIGLYCERIDE 163 (H) 09/27/2010 07:15  "AM       Iron deficiency  Chronic, unknown control, we will reevaluate with labs, as below.    Vitamin D deficiency  Chronic, unknown control, we will reevaluate with labs, as below.      Patient Active Problem List    Diagnosis Date Noted   • Gastroesophageal reflux disease with esophagitis 04/11/2019   • Intramural leiomyoma of uterus 10/11/2018   • Esophagospasm 10/11/2018   • Iron deficiency 04/05/2018   • Mixed hyperlipidemia 04/05/2018   • Vitamin D deficiency 04/05/2018   • Dysfunctional uterine bleeding 04/05/2018   • Hiatal hernia 04/05/2018   • Essential hypertension 04/05/2018       Additional History:   Allergies:    Patient has no known allergies.     Current Medications:     Current Outpatient Prescriptions   Medication Sig Dispense Refill   • atorvastatin (LIPITOR) 10 MG Tab Take 1 Tab by mouth every day. 90 Tab 0   • losartan (COZAAR) 25 MG Tab Take 1 Tab by mouth every day. 90 Tab 0   • omeprazole (PRILOSEC) 20 MG delayed-release capsule Take 1 Cap by mouth 2 times a day. 180 Cap 0   • Misc Natural Products (FOCUSED MIND PO) Take  by mouth.       No current facility-administered medications for this visit.         Social History:     Social History   Substance Use Topics   • Smoking status: Never Smoker   • Smokeless tobacco: Never Used   • Alcohol use No       ROS:     - NOTE: All other systems reviewed and are negative, except as in HPI.     Objective:   Physical Exam:    Vitals: /62 (BP Location: Left arm, Patient Position: Sitting, BP Cuff Size: Adult)   Pulse 61   Temp 36.7 °C (98 °F) (Temporal)   Ht 1.6 m (5' 3\")   Wt 53.9 kg (118 lb 13.3 oz)   SpO2 95%    BMI: Body mass index is 21.05 kg/m².   General/Constitutional: Vitals as above, Well nourished, well developed female in no acute distress   Head/Eyes: Head is grossly normal & atraumatic, bilateral conjunctivae clear and not injected, bilateral EOMI, bilateral PERRL   ENT: Bilateral external ears grossly normal in appearance, " Hearing grossly intact, External nares normal in appearance and without discharge/bleeding   Respiratory: No respiratory distress, bilateral lungs are clear to ausculation in all lung fields (anterior/lateral/posterior), no wheezing/rhonchi/rales   Cardiovascular: Regular rate and rhythm without murmur/gallops/rubs, distal pulses are intact and equal bilaterally (radial, posterior tibial), no bilateral lower extremity edema   MSK: Gait grossly normal & not antalgic   Integumentary: No apparent rashes   Psych: Judgment grossly appropriate, no apparent depression/anxiety    Health Maintenance:     - 03/22/19 -- Mammo WNL    - We are requesting her records for Pap smear from her gynecologist, Dr. Melvi Barksdale    Imaging/Labs:     - Reviewed and interpreted as in HPI, above     Assessment and Plan:   1. Esophagospasm  New problem, uncontrolled, referral to gastroenterology for further evaluation and management.   - REFERRAL TO GASTROENTEROLOGY    2. Gastroesophageal reflux disease with esophagitis  Chronic problem, uncontrolled, referral to gastroenterology for further evaluation and management.  Patient to take her omeprazole more regularly.  Referral to vascular neurology to evaluate her hiatal hernia and GERD.   - REFERRAL TO GASTROENTEROLOGY    3. Hiatal hernia  Chronic problem, uncontrolled, referral to gastroenterology for further evaluation and management.  Patient to take her omeprazole more regularly.   - omeprazole (PRILOSEC) 20 MG delayed-release capsule; Take 1 Cap by mouth 2 times a day.  Dispense: 180 Cap; Refill: 0    4. Essential hypertension  Chronic, stable, well-controlled.  Continue taking medication as directed.  Labs as below to evaluate.   - HEMOGLOBIN A1C; Future   - Comp Metabolic Panel; Future   - Lipid Profile; Future   - MICROALBUMIN CREAT RATIO URINE; Future   - losartan (COZAAR) 25 MG Tab; Take 1 Tab by mouth every day.  Dispense: 90 Tab; Refill: 0    5. Mixed hyperlipidemia  Chronic,  uncontrolled, patient advised to pursue lifestyle changes, particularly cardiovascular exercise and increasing proportion of plant-based nutrition.   - Lipid Profile; Future   - atorvastatin (LIPITOR) 10 MG Tab; Take 1 Tab by mouth every day.  Dispense: 90 Tab; Refill: 0    6. Iron deficiency  Chronic, unknown control, evaluate with labs as below   - CBC WITH DIFFERENTIAL; Future   - IRON/TOTAL IRON BIND; Future   - FERRITIN; Future    7. Vitamin D deficiency  Chronic, unknown control, evaluate with labs as below   - VITAMIN D,25 HYDROXY; Future    8. Annual physical exam  9. Diabetes mellitus screening  Labs ordered for annual physical exam   - HEMOGLOBIN A1C; Future   - Comp Metabolic Panel; Future   - Lipid Profile; Future   - CBC WITH DIFFERENTIAL; Future   - IRON/TOTAL IRON BIND; Future   - FERRITIN; Future   - MICROALBUMIN CREAT RATIO URINE; Future   - VITAMIN D,25 HYDROXY; Future      RTC: in 1month for Annual Medical Exam.    PLEASE NOTE: This dictation was created using voice recognition software. I have made every reasonable attempt to correct obvious errors, but I expect that there are errors of grammar and possibly content that I did not discover before finalizing the note.

## 2019-04-11 NOTE — ASSESSMENT & PLAN NOTE
Chronic, uncontrolled, please see notes from same date of service 4/11/2019 regarding esophagospasm.

## 2019-05-04 LAB
25(OH)D3+25(OH)D2 SERPL-MCNC: 28.9 NG/ML (ref 30–100)
ALBUMIN SERPL-MCNC: 4.5 G/DL (ref 3.5–5.5)
ALBUMIN/CREAT UR: 13.3 MG/G CREAT (ref 0–30)
ALBUMIN/GLOB SERPL: 1.7 {RATIO} (ref 1.2–2.2)
ALP SERPL-CCNC: 70 IU/L (ref 39–117)
ALT SERPL-CCNC: 21 IU/L (ref 0–32)
AST SERPL-CCNC: 22 IU/L (ref 0–40)
BASOPHILS # BLD AUTO: 0 X10E3/UL (ref 0–0.2)
BASOPHILS NFR BLD AUTO: 1 %
BILIRUB SERPL-MCNC: 0.9 MG/DL (ref 0–1.2)
BUN SERPL-MCNC: 15 MG/DL (ref 6–24)
BUN/CREAT SERPL: 17 (ref 9–23)
CALCIUM SERPL-MCNC: 9.5 MG/DL (ref 8.7–10.2)
CHLORIDE SERPL-SCNC: 103 MMOL/L (ref 96–106)
CHOLEST SERPL-MCNC: 199 MG/DL (ref 100–199)
CO2 SERPL-SCNC: 22 MMOL/L (ref 20–29)
CREAT SERPL-MCNC: 0.88 MG/DL (ref 0.57–1)
CREAT UR-MCNC: 106.4 MG/DL
EOSINOPHIL # BLD AUTO: 0.3 X10E3/UL (ref 0–0.4)
EOSINOPHIL NFR BLD AUTO: 5 %
ERYTHROCYTE [DISTWIDTH] IN BLOOD BY AUTOMATED COUNT: 16.3 % (ref 12.3–15.4)
FERRITIN SERPL-MCNC: 19 NG/ML (ref 15–150)
GLOBULIN SER CALC-MCNC: 2.7 G/DL (ref 1.5–4.5)
GLUCOSE SERPL-MCNC: 99 MG/DL (ref 65–99)
HBA1C MFR BLD: 5.9 % (ref 4.8–5.6)
HCT VFR BLD AUTO: 42.7 % (ref 34–46.6)
HDLC SERPL-MCNC: 57 MG/DL
HGB BLD-MCNC: 14.2 G/DL (ref 11.1–15.9)
IMM GRANULOCYTES # BLD AUTO: 0 X10E3/UL (ref 0–0.1)
IMM GRANULOCYTES NFR BLD AUTO: 0 %
IMMATURE CELLS  115398: ABNORMAL
IRON SATN MFR SERPL: 17 % (ref 15–55)
IRON SERPL-MCNC: 71 UG/DL (ref 27–159)
LABORATORY COMMENT REPORT: ABNORMAL
LDLC SERPL CALC-MCNC: 113 MG/DL (ref 0–99)
LYMPHOCYTES # BLD AUTO: 1.9 X10E3/UL (ref 0.7–3.1)
LYMPHOCYTES NFR BLD AUTO: 28 %
MCH RBC QN AUTO: 27.6 PG (ref 26.6–33)
MCHC RBC AUTO-ENTMCNC: 33.3 G/DL (ref 31.5–35.7)
MCV RBC AUTO: 83 FL (ref 79–97)
MICROALBUMIN UR-MCNC: 14.2 UG/ML
MONOCYTES # BLD AUTO: 0.5 X10E3/UL (ref 0.1–0.9)
MONOCYTES NFR BLD AUTO: 8 %
MORPHOLOGY BLD-IMP: ABNORMAL
NEUTROPHILS # BLD AUTO: 3.9 X10E3/UL (ref 1.4–7)
NEUTROPHILS NFR BLD AUTO: 58 %
NRBC BLD AUTO-RTO: ABNORMAL %
PLATELET # BLD AUTO: 297 X10E3/UL (ref 150–379)
POTASSIUM SERPL-SCNC: 4.3 MMOL/L (ref 3.5–5.2)
PROT SERPL-MCNC: 7.2 G/DL (ref 6–8.5)
RBC # BLD AUTO: 5.14 X10E6/UL (ref 3.77–5.28)
SODIUM SERPL-SCNC: 141 MMOL/L (ref 134–144)
TIBC SERPL-MCNC: 408 UG/DL (ref 250–450)
TRIGL SERPL-MCNC: 147 MG/DL (ref 0–149)
UIBC SERPL-MCNC: 337 UG/DL (ref 131–425)
VLDLC SERPL CALC-MCNC: 29 MG/DL (ref 5–40)
WBC # BLD AUTO: 6.6 X10E3/UL (ref 3.4–10.8)

## 2019-05-10 ENCOUNTER — OFFICE VISIT (OUTPATIENT)
Dept: MEDICAL GROUP | Facility: MEDICAL CENTER | Age: 44
End: 2019-05-10
Payer: COMMERCIAL

## 2019-05-10 VITALS
OXYGEN SATURATION: 95 % | SYSTOLIC BLOOD PRESSURE: 100 MMHG | TEMPERATURE: 97.8 F | HEIGHT: 63 IN | WEIGHT: 119 LBS | DIASTOLIC BLOOD PRESSURE: 60 MMHG | BODY MASS INDEX: 21.09 KG/M2 | HEART RATE: 63 BPM

## 2019-05-10 DIAGNOSIS — R73.03 PREDIABETES: ICD-10-CM

## 2019-05-10 DIAGNOSIS — Z00.00 ANNUAL PHYSICAL EXAM: Primary | ICD-10-CM

## 2019-05-10 DIAGNOSIS — K44.9 HIATAL HERNIA: ICD-10-CM

## 2019-05-10 DIAGNOSIS — N95.1 PERIMENOPAUSE: ICD-10-CM

## 2019-05-10 DIAGNOSIS — E78.2 MIXED HYPERLIPIDEMIA: ICD-10-CM

## 2019-05-10 DIAGNOSIS — I10 ESSENTIAL HYPERTENSION: ICD-10-CM

## 2019-05-10 DIAGNOSIS — N93.8 DYSFUNCTIONAL UTERINE BLEEDING: ICD-10-CM

## 2019-05-10 PROBLEM — E61.1 IRON DEFICIENCY: Status: RESOLVED | Noted: 2018-04-05 | Resolved: 2019-05-10

## 2019-05-10 PROCEDURE — 99214 OFFICE O/P EST MOD 30 MIN: CPT | Mod: 25 | Performed by: FAMILY MEDICINE

## 2019-05-10 PROCEDURE — 99396 PREV VISIT EST AGE 40-64: CPT | Performed by: FAMILY MEDICINE

## 2019-05-10 RX ORDER — OMEPRAZOLE 20 MG/1
20 CAPSULE, DELAYED RELEASE ORAL 2 TIMES DAILY
Qty: 180 CAP | Refills: 3 | Status: SHIPPED | OUTPATIENT
Start: 2019-05-10

## 2019-05-10 RX ORDER — ATORVASTATIN CALCIUM 10 MG/1
10 TABLET, FILM COATED ORAL DAILY
Qty: 90 TAB | Refills: 3 | Status: SHIPPED | OUTPATIENT
Start: 2019-05-10

## 2019-05-10 RX ORDER — LOSARTAN POTASSIUM 25 MG/1
25 TABLET ORAL
Qty: 90 TAB | Refills: 3 | Status: SHIPPED | OUTPATIENT
Start: 2019-05-10

## 2019-05-10 ASSESSMENT — PATIENT HEALTH QUESTIONNAIRE - PHQ9: CLINICAL INTERPRETATION OF PHQ2 SCORE: 0

## 2019-05-10 NOTE — ASSESSMENT & PLAN NOTE
Patient and I discussed recent labs (see below; elevated LDL without HLD) and that ASCVD risk is increased based on most recent lipid panel, current blood pressure (hypertensive, with medication), diabetes status (prediabetic), and smoking status (non-smoker).    Patient and I then discussed necessary dietary changes to make to address dyslipidemia.  Patient is currently taking cholesterol lowering medication: Atorvastatin, and we discussed that patient should make more changes as well as to increase cardiovascular exercise.  Patient verbalized understanding.    ROS is NEGATIVE for dizziness, generalized weakness/fatigue, vision/hearing changes, jaw pain/paresthesias, BUE pain/paresthesias/numbness/weakness, chest pain/pressure, palpitations, dyspnea, BLE edema.    Lab Results   Component Value Date/Time    CHOLSTRLTOT 199 05/03/2019 08:43 AM    CHOLSTRLTOT 260 (H) 09/27/2010 07:15 AM     (H) 05/03/2019 08:43 AM     (H) 09/27/2010 07:15 AM    HDL 57 05/03/2019 08:43 AM    HDL 61 09/27/2010 07:15 AM    TRIGLYCERIDE 147 05/03/2019 08:43 AM    TRIGLYCERIDE 163 (H) 09/27/2010 07:15 AM

## 2019-05-10 NOTE — ASSESSMENT & PLAN NOTE
Chronic, unknown control, patient to have follow-up endoscopy in June with [facility].

## 2019-05-10 NOTE — LETTER
[facility]  Marbin Long M.D.  [address]  [place] [address]  Fax: [phone]   Authorization for Release/Disclosure of   Protected Health Information   Name: YENI FATIMA : 1975 SSN: [id number]   Address: [address] [facility] [address] Phone:    [phone] (home)    I authorize the entity listed below to release/disclose the PHI below to:   [facility]/Marbin Long M.D. and Marbin Long M.D.   Provider or Entity Name:  Dr. Barksdale  [facility]   Address   City, [facility]   Phone:      Fax:     Reason for request: continuity of care   Information to be released:    [  ] LAST COLONOSCOPY,  including any PATH REPORT and follow-up  [  ] LAST FIT/COLOGUARD RESULT [  ] LAST DEXA  [  ] LAST MAMMOGRAM  [X] LAST PAP  [  ] LAST LABS [  ] RETINA EXAM REPORT  [  ] IMMUNIZATION RECORDS  [X] Release all info re: Annual Well Woman Exam      [  ] Check here and initial the line next to each item to release ALL health information INCLUDING  _____ Care and treatment for drug and / or alcohol abuse  _____ HIV testing, infection status, or AIDS  _____ Genetic Testing    DATES OF SERVICE OR TIME PERIOD TO BE DISCLOSED: _____________  I understand and acknowledge that:  * This Authorization may be revoked at any time by you in writing, except if your health information has already been used or disclosed.  * Your health information that will be used or disclosed as a result of you signing this authorization could be re-disclosed by the recipient. If this occurs, your re-disclosed health information may no longer be protected by State or Federal laws.  * You may refuse to sign this Authorization. Your refusal will not affect your ability to obtain treatment.  * This Authorization becomes effective upon signing and will  on (date) __________.      If no date is indicated, this Authorization will  one (1) year from the signature date.    Name: Yeni  Devang Ozuna    Signature:   Date:     5/10/2019       PLEASE FAX REQUESTED RECORDS BACK TO: [phone]

## 2019-05-10 NOTE — ASSESSMENT & PLAN NOTE
Acute exacerbation chronic condition, uncontrolled, patient is deemed to be perimenopausal as her last menstrual period began on 3/22/2019, and she has not had.  Since that time.  Please see notes from same date of service 5/10/2019 regarding perimenopause

## 2019-05-10 NOTE — ASSESSMENT & PLAN NOTE
New problem, uncontrolled, patient states that LMP was on 3/22/2019, and that since that time she has been seeing hot flashes and mood changes.  These have been intrusive, and patient is wondering what over-the-counter alternatives or supplements she can take to try to address his symptoms.  We have discussed briefly that she could also use hormone replacement therapy.  Patient verbalized understanding.    Therefore, patient has been supplied information from [facility] and [facility] regarding [place]'s wort and black cohosh, respectively.

## 2019-05-10 NOTE — PATIENT INSTRUCTIONS
"Dr. Long's tips for \"Lifestyle Medicine:\"     Check out the talk/documentary on \"How not to die\" by Dr. Edmundo Mccarty (on his website nutritionfacts.org, he also authored a book with this title).       1) Make SMART lifestyle changes: Specific, Measurable, Attainable, Relevant, Time-sensitive.  The lifestyle changes that you need to make are with regards to: nutrition, cardiovascular exercise, sleep, stress management.  Make these changes every 2 weeks, revisiting the previous goals and perhaps revising them and/or setting new ones.       2) Nutrition: Make as many changes as you can to increase the amount of whole-foods (not Whole Foods, necessarily!  ;-)), plant-based diet as possible:   A) Books: Eat to Live (Dr. Ciro Cochran), The Spectrum (Dr. Abdoul Jacome), The Starch Solution (Dr. Kishan Hartman)      B) Documentaries (can usually be found on pr2go.com): [place].  Fat, Sick, and Nearly Dead.  Fed Up.           3) Cardiovascular Exercise: The center for disease control recommends a minimum of 150 minutes per week of moderate intensity cardiovascular exercise for weight maintenance and cardiovascular health.  Set this as your initial goal, with at least 30 minutes per session. Types of exercise can include 30 minutes of elliptical, 30 minutes of decently fast jog, 30 minutes of swimming, 30 minutes of heavy gardening (lifting big bags of fertilizer, digging deep holes/ditches).  He can cut down the minute requirements to half, by doing higher intensity sports such as a game of tennis, or soccer.  He notes the library and check out with they have for home exercise programs, as well.       4) Sleep:    A) Goal: Obtain a minimum of 7-8hours of continuous, uninterrupted, restful sleep per night.    B) Tips for Sleep Hygiene:    I) Go to bed and wake up at consistent times whether work/school day or not.     II) Keep room dark, quiet, and comfortable.  Increase exposure to sunlight during awake times and " avoid bright lights (especially anything with a backlight) at least the last 1-2hours before going to sleep.     III) Don't nap.     IV) Avoid stimulant or caffeine use more than 4 hours after wake time.        5) Stress Management: You cannot change the stresses of life dizziness necessarily, but you can change how he responds of them. One good way to manage stress is to write things down in order to help you process how to approach things in general or specifically. Another good way is to talk it out with someone you trusts, specifically your significant other or good friend. A definite great way to deal with stress is to have cardiovascular exercise!

## 2019-05-10 NOTE — ASSESSMENT & PLAN NOTE
We discussed that patient is diagnosed with prediabetes, new diagnosis, uncontrolled, given that the A1c is:   Lab Results   Component Value Date/Time    HBA1C 5.9 (H) 05/03/2019 08:43 AM        Patient is currently taking (no medication) for glycemic control, and (atorvastatin, losartan) for health maintenance related to diabetes mellitus.    We discussed that prediabetes/diabetes mellitus type 2 are medical conditions of lifestyle habits (less than optimal dietary choices, insufficient cardiovascular exercise), and that they can be controlled (in part or in whole) by these lifestyle changes.     Furthermore, we discussed that at present time it is better to pursue lifestyle changes rather than starting medications. Patient is in agreement.     ROS is NEGATIVE for blurred vision, polydipsia, polyuria, diaphoresis, palpitations, fatigue, irritability, flank pain, BLE paresthesias.

## 2019-05-10 NOTE — PROGRESS NOTES
Subjective:   Chief Complaint/History of Present Illness:  Yeni Glasgow is a 43 y.o. female established who presents today for Annual Medical exam, to review the following medical problems, and to discuss concerns regarding perimenopause and changes to periods as well as associated symptoms.      The primary encounter diagnosis was Annual physical exam. Diagnoses of Perimenopause, Dysfunctional uterine bleeding, Mixed hyperlipidemia, Prediabetes, Essential hypertension, and Hiatal hernia were also pertinent to this visit.    PMH, PSH, Social History, Medications, Allergies, FMH all reviewed as documented:    Perimenopause  New problem, uncontrolled, patient states that LMP was on 3/22/2019, and that since that time she has been seeing hot flashes and mood changes.  These have been intrusive, and patient is wondering what over-the-counter alternatives or supplements she can take to try to address his symptoms.  We have discussed briefly that she could also use hormone replacement therapy.  Patient verbalized understanding.    Therefore, patient has been supplied information from [facility] and [facility] regarding Ayana's wort and black cohosh, respectively.    Dysfunctional uterine bleeding  Acute exacerbation chronic condition, uncontrolled, patient is deemed to be perimenopausal as her last menstrual period began on 3/22/2019, and she has not had.  Since that time.  Please see notes from same date of service 5/10/2019 regarding perimenopause    Mixed hyperlipidemia  Patient and I discussed recent labs (see below; elevated LDL without HLD) and that ASCVD risk is increased based on most recent lipid panel, current blood pressure (hypertensive, with medication), diabetes status (prediabetic), and smoking status (non-smoker).    Patient and I then discussed necessary dietary changes to make to address dyslipidemia.  Patient is currently taking cholesterol lowering medication: Atorvastatin, and we discussed that  patient should make more changes as well as to increase cardiovascular exercise.  Patient verbalized understanding.    ROS is NEGATIVE for dizziness, generalized weakness/fatigue, vision/hearing changes, jaw pain/paresthesias, BUE pain/paresthesias/numbness/weakness, chest pain/pressure, palpitations, dyspnea, BLE edema.    Lab Results   Component Value Date/Time    CHOLSTRLTOT 199 05/03/2019 08:43 AM    CHOLSTRLTOT 260 (H) 09/27/2010 07:15 AM     (H) 05/03/2019 08:43 AM     (H) 09/27/2010 07:15 AM    HDL 57 05/03/2019 08:43 AM    HDL 61 09/27/2010 07:15 AM    TRIGLYCERIDE 147 05/03/2019 08:43 AM    TRIGLYCERIDE 163 (H) 09/27/2010 07:15 AM       Prediabetes  We discussed that patient is diagnosed with prediabetes, new diagnosis, uncontrolled, given that the A1c is:   Lab Results   Component Value Date/Time    HBA1C 5.9 (H) 05/03/2019 08:43 AM        Patient is currently taking (no medication) for glycemic control, and (atorvastatin, losartan) for health maintenance related to diabetes mellitus.    We discussed that prediabetes/diabetes mellitus type 2 are medical conditions of lifestyle habits (less than optimal dietary choices, insufficient cardiovascular exercise), and that they can be controlled (in part or in whole) by these lifestyle changes.     Furthermore, we discussed that at present time it is better to pursue lifestyle changes rather than starting medications. Patient is in agreement.     ROS is NEGATIVE for blurred vision, polydipsia, polyuria, diaphoresis, palpitations, fatigue, irritability, flank pain, BLE paresthesias.    Essential hypertension  Chronic, stable, well-controlled, taking medication as directed.     Hiatal hernia  Chronic, unknown control, patient to have follow-up endoscopy in June with [facility].      Patient Active Problem List    Diagnosis Date Noted   • Perimenopause 05/10/2019   • Prediabetes 05/10/2019   • Gastroesophageal reflux disease with  "esophagitis 04/11/2019   • Intramural leiomyoma of uterus 10/11/2018   • Esophagospasm 10/11/2018   • Mixed hyperlipidemia 04/05/2018   • Vitamin D deficiency 04/05/2018   • Dysfunctional uterine bleeding 04/05/2018   • Hiatal hernia 04/05/2018   • Essential hypertension 04/05/2018       Additional History:   Allergies:    Patient has no known allergies.     Medications:     Current Outpatient Prescriptions Ordered in [facility]   Medication Sig Dispense Refill   • losartan (COZAAR) 25 MG Tab Take 1 Tab by mouth every day. 90 Tab 3   • atorvastatin (LIPITOR) 10 MG Tab Take 1 Tab by mouth every day. 90 Tab 3   • omeprazole (PRILOSEC) 20 MG delayed-release capsule Take 1 Cap by mouth 2 times a day. 180 Cap 3   • Misc Natural Products (FOCUSED MIND PO) Take  by mouth.       No current Epic-ordered facility-administered medications on file.         Past Medical History:   No past medical history on file.     Past Surgical History:     Past Surgical History:   Procedure Laterality Date   • TUBAL COAGULATION LAPAROSCOPIC BILATERAL  2003 lb         Social History:     Social History   Substance Use Topics   • Smoking status: Never Smoker   • Smokeless tobacco: Never Used   • Alcohol use No        Family History:     Family Status   Relation Status   • Mo Alive   • Fa Alive   • Sis Alive   • Bro Alive   • Bro Alive   • Sis Alive   • Neg Hx (Not Specified)        Family History   Problem Relation Age of Onset   • Cancer Neg Hx        ROS:     - NOTE: All other systems reviewed and are negative, except as in HPI.     Objective:   Physical Exam:    Vitals: /60   Pulse 63   Temp 36.6 °C (97.8 °F)   Ht 1.6 m (5' 2.99\")   Wt 54 kg (119 lb)   SpO2 95%    BMI: Body mass index is 21.09 kg/m².   General/Constitutional: Vitals as above, Well nourished, well developed female in no acute distress   Head/Eyes: Head is grossly normal & atraumatic, bilateral conjunctivae clear and not injected, bilateral EOMI, bilateral PERRL   ENT: " Bilateral external ears grossly normal in appearance, Hearing grossly intact, External nares normal in appearance and without discharge/bleeding, bilateral tympanic membranes with appropriate cone of light reflex   Respiratory: No respiratory distress, bilateral lungs are clear to ausculation in all lung fields (anterior/lateral/posterior), no wheezing/rhonchi/rales   Cardiovascular: Regular rate and rhythm without murmur/gallops/rubs, distal pulses are intact and equal bilaterally (radial, posterior tibial), no bilateral lower extremity edema   MSK: No paraspinal muscular tenderness to palpation of entire back, No pain on gentle percussion of spinous processes of entire back, Gait grossly normal & not antalgic   Integumentary: No apparent rashes   Psych: Judgment grossly appropriate, no apparent depression/anxiety    Health Maintenance:     - Reviewed and found to be up-to-date    Imaging/Labs:     - 05/04/19 -- elevated LDL (bad) cholesterol, prediabetes, vitamin D deficiency, iron labs are normal, other labs are normal    Assessment and Plan:   1. Annual physical exam  Patient in decent health    2. Perimenopause  3. Dysfunctional uterine bleeding  New problem, uncontrolled, patient to trial OTC supplements such as black cohosh and Ducor's wort.    4. Mixed hyperlipidemia  Chronic, uncontrolled, patient advised to pursue lifestyle changes, particularly cardiovascular exercise and increasing proportion of plant-based nutrition.  Patient to continue Atorvastatin.   - atorvastatin (LIPITOR) 10 MG Tab; Take 1 Tab by mouth every day.  Dispense: 90 Tab; Refill: 3   - Lipid Profile; Future   - LIPOPROTEIN A; Future   - CRP HIGH SENSITIVE (CARDIAC); Future   - HOMOCYSTEINE; Future    5. Prediabetes  Chronic, uncontrolled, patient advised to pursue lifestyle changes, particularly cardiovascular exercise and increasing proportion of plant-based nutrition.    6. Essential hypertension  Chronic, stable, well-controlled.   Continue taking medication as directed.    - losartan (COZAAR) 25 MG Tab; Take 1 Tab by mouth every day.  Dispense: 90 Tab; Refill: 3    7. Hiatal hernia  Chronic, unknown control, continue with Endoscopy in June 2019.  Continue Omeprazole.   - omeprazole (PRILOSEC) 20 MG delayed-release capsule; Take 1 Cap by mouth 2 times a day.  Dispense: 180 Cap; Refill: 3    NOTE: Patient and I discussed the importance of lifestyle changes, with particular emphasis on plant-based nutrition (for the purposes of weight loss, general health, HLD, prediabetes, HTN), as well as cardiovascular exercise, proper sleep, and stress management.  This discussion is briefly summarized in the patient instruction section below.  Patient verbalized understanding.    RTC: in 6months for Lifestyle Changes management, Labs follow-up.    PLEASE NOTE: This dictation was created using voice recognition software. I have made every reasonable attempt to correct obvious errors, but I expect that there are errors of grammar and possibly content that I did not discover before finalizing the note.

## 2019-09-18 ENCOUNTER — OFFICE VISIT (OUTPATIENT)
Dept: URGENT CARE | Facility: PHYSICIAN GROUP | Age: 44
End: 2019-09-18
Payer: COMMERCIAL

## 2019-09-18 VITALS
TEMPERATURE: 98.1 F | HEIGHT: 60 IN | BODY MASS INDEX: 24.15 KG/M2 | SYSTOLIC BLOOD PRESSURE: 140 MMHG | HEART RATE: 61 BPM | WEIGHT: 123 LBS | OXYGEN SATURATION: 99 % | DIASTOLIC BLOOD PRESSURE: 82 MMHG | RESPIRATION RATE: 12 BRPM

## 2019-09-18 DIAGNOSIS — H66.001 ACUTE SUPPURATIVE OTITIS MEDIA OF RIGHT EAR WITHOUT SPONTANEOUS RUPTURE OF TYMPANIC MEMBRANE, RECURRENCE NOT SPECIFIED: ICD-10-CM

## 2019-09-18 DIAGNOSIS — R42 DIZZINESS: ICD-10-CM

## 2019-09-18 PROCEDURE — 99214 OFFICE O/P EST MOD 30 MIN: CPT | Performed by: PHYSICIAN ASSISTANT

## 2019-09-18 RX ORDER — AMOXICILLIN 875 MG/1
875 TABLET, COATED ORAL 2 TIMES DAILY
Qty: 20 TAB | Refills: 0 | Status: SHIPPED | OUTPATIENT
Start: 2019-09-18 | End: 2021-12-03

## 2019-09-19 NOTE — PROGRESS NOTES
Subjective:      Yeni Glasgow is a 43 y.o. female who presents with Dizziness (fatigue, x 2 days pt stated she previously had a cough & cold sx's she recovered from)    PMH:  Reviewed with patient/family member/EPIC.    MEDS:   Current Outpatient Medications:   •  losartan (COZAAR) 25 MG Tab, Take 1 Tab by mouth every day., Disp: 90 Tab, Rfl: 3  •  atorvastatin (LIPITOR) 10 MG Tab, Take 1 Tab by mouth every day., Disp: 90 Tab, Rfl: 3  •  omeprazole (PRILOSEC) 20 MG delayed-release capsule, Take 1 Cap by mouth 2 times a day., Disp: 180 Cap, Rfl: 3  •  Misc Natural Products (FOCUSED MIND PO), Take  by mouth., Disp: , Rfl:   ALLERGIES: No Known Allergies  SURGHX:   Past Surgical History:   Procedure Laterality Date   • TUBAL COAGULATION LAPAROSCOPIC BILATERAL  2003 lb      SOCHX:  reports that she has never smoked. She has never used smokeless tobacco. She reports that she does not drink alcohol or use drugs.  FH: Reviewed with patient, not pertinent to this visit.           Patient presents with:  Dizziness: fatigue, x 2 days pt stated she previously had a cough & cold sx's she recovering from.         URI    This is a new problem. The current episode started in the past 7 days. The problem has been unchanged. There has been no fever. Associated symptoms include congestion, coughing, ear pain, rhinorrhea and sinus pain. Pertinent negatives include no headaches or wheezing. She has tried nothing for the symptoms. The treatment provided no relief.       Review of Systems   Constitutional: Negative for chills and fever.   HENT: Positive for congestion, ear pain, rhinorrhea and sinus pain.    Respiratory: Positive for cough. Negative for wheezing.    Neurological: Positive for dizziness. Negative for headaches.   All other systems reviewed and are negative.         Objective:     /82 (BP Location: Left arm, Patient Position: Sitting, BP Cuff Size: Adult)   Pulse 61   Temp 36.7 °C (98.1 °F) (Temporal)    Resp 12   Ht 1.524 m (5')   Wt 55.8 kg (123 lb)   SpO2 99%   BMI 24.02 kg/m²      Physical Exam   Constitutional: She is oriented to person, place, and time. She appears well-developed and well-nourished. No distress.   HENT:   Head: Normocephalic and atraumatic.   Right Ear: Tympanic membrane is erythematous and retracted. A middle ear effusion is present.   Left Ear: Tympanic membrane normal.   Nose: Rhinorrhea present.   Mouth/Throat: Uvula is midline, oropharynx is clear and moist and mucous membranes are normal.   Eyes: Pupils are equal, round, and reactive to light. Conjunctivae and EOM are normal.   Neck: Normal range of motion. Neck supple.   Cardiovascular: Normal rate.   Pulmonary/Chest: Effort normal.   Musculoskeletal: Normal range of motion.   Neurological: She is alert and oriented to person, place, and time.   Skin: Skin is warm and dry. Capillary refill takes less than 2 seconds.   Psychiatric: She has a normal mood and affect.   Nursing note and vitals reviewed.              Assessment/Plan:     1. Acute suppurative otitis media of right ear without spontaneous rupture of tympanic membrane, recurrence not specified  amoxicillin (AMOXIL) 875 MG tablet   2. Dizziness       Dizziness is likely secondary to ear fullness/infection.    OTC sudafed would be helpful for congestion and help with dizziness.     PT should follow up with PCP in 1-2 days for re-evaluation if symptoms have not improved.  Discussed red flags and reasons to return to UC or ED.  Pt and/or family verbalized understanding of diagnosis and follow up instructions and was offered informational handout on diagnosis.  PT discharged.

## 2019-09-22 ASSESSMENT — ENCOUNTER SYMPTOMS
DIZZINESS: 1
SINUS PAIN: 1
WHEEZING: 0
COUGH: 1
FEVER: 0
CHILLS: 0
RHINORRHEA: 1
HEADACHES: 0

## 2021-05-01 ENCOUNTER — HOSPITAL ENCOUNTER (OUTPATIENT)
Dept: LAB | Facility: MEDICAL CENTER | Age: 46
End: 2021-05-01
Attending: FAMILY MEDICINE
Payer: COMMERCIAL

## 2021-05-01 LAB
ALBUMIN SERPL BCP-MCNC: 4.4 G/DL (ref 3.2–4.9)
ALBUMIN/GLOB SERPL: 1.4 G/DL
ALP SERPL-CCNC: 80 U/L (ref 30–99)
ALT SERPL-CCNC: 22 U/L (ref 2–50)
ANION GAP SERPL CALC-SCNC: 9 MMOL/L (ref 7–16)
AST SERPL-CCNC: 23 U/L (ref 12–45)
BASOPHILS # BLD AUTO: 0.8 % (ref 0–1.8)
BASOPHILS # BLD: 0.05 K/UL (ref 0–0.12)
BILIRUB SERPL-MCNC: 0.7 MG/DL (ref 0.1–1.5)
BUN SERPL-MCNC: 19 MG/DL (ref 8–22)
CALCIUM SERPL-MCNC: 9.2 MG/DL (ref 8.5–10.5)
CHLORIDE SERPL-SCNC: 105 MMOL/L (ref 96–112)
CHOLEST SERPL-MCNC: 237 MG/DL (ref 100–199)
CO2 SERPL-SCNC: 27 MMOL/L (ref 20–33)
CREAT SERPL-MCNC: 0.74 MG/DL (ref 0.5–1.4)
EOSINOPHIL # BLD AUTO: 0.29 K/UL (ref 0–0.51)
EOSINOPHIL NFR BLD: 4.7 % (ref 0–6.9)
ERYTHROCYTE [DISTWIDTH] IN BLOOD BY AUTOMATED COUNT: 42.4 FL (ref 35.9–50)
FASTING STATUS PATIENT QL REPORTED: NORMAL
GLOBULIN SER CALC-MCNC: 3.1 G/DL (ref 1.9–3.5)
GLUCOSE SERPL-MCNC: 91 MG/DL (ref 65–99)
HCT VFR BLD AUTO: 45.5 % (ref 37–47)
HDLC SERPL-MCNC: 63 MG/DL
HGB BLD-MCNC: 15 G/DL (ref 12–16)
IMM GRANULOCYTES # BLD AUTO: 0.03 K/UL (ref 0–0.11)
IMM GRANULOCYTES NFR BLD AUTO: 0.5 % (ref 0–0.9)
LDLC SERPL CALC-MCNC: 127 MG/DL
LYMPHOCYTES # BLD AUTO: 2 K/UL (ref 1–4.8)
LYMPHOCYTES NFR BLD: 32.5 % (ref 22–41)
MCH RBC QN AUTO: 30.3 PG (ref 27–33)
MCHC RBC AUTO-ENTMCNC: 33 G/DL (ref 33.6–35)
MCV RBC AUTO: 91.9 FL (ref 81.4–97.8)
MONOCYTES # BLD AUTO: 0.47 K/UL (ref 0–0.85)
MONOCYTES NFR BLD AUTO: 7.6 % (ref 0–13.4)
NEUTROPHILS # BLD AUTO: 3.32 K/UL (ref 2–7.15)
NEUTROPHILS NFR BLD: 53.9 % (ref 44–72)
NRBC # BLD AUTO: 0 K/UL
NRBC BLD-RTO: 0 /100 WBC
PLATELET # BLD AUTO: 282 K/UL (ref 164–446)
PMV BLD AUTO: 10.1 FL (ref 9–12.9)
POTASSIUM SERPL-SCNC: 4 MMOL/L (ref 3.6–5.5)
PROT SERPL-MCNC: 7.5 G/DL (ref 6–8.2)
RBC # BLD AUTO: 4.95 M/UL (ref 4.2–5.4)
SODIUM SERPL-SCNC: 141 MMOL/L (ref 135–145)
TRIGL SERPL-MCNC: 233 MG/DL (ref 0–149)
WBC # BLD AUTO: 6.2 K/UL (ref 4.8–10.8)

## 2021-05-01 PROCEDURE — 36415 COLL VENOUS BLD VENIPUNCTURE: CPT

## 2021-05-01 PROCEDURE — 80061 LIPID PANEL: CPT

## 2021-05-01 PROCEDURE — 80053 COMPREHEN METABOLIC PANEL: CPT

## 2021-05-01 PROCEDURE — 82306 VITAMIN D 25 HYDROXY: CPT

## 2021-05-01 PROCEDURE — 85025 COMPLETE CBC W/AUTO DIFF WBC: CPT

## 2021-05-04 LAB — 25(OH)D3 SERPL-MCNC: 47 NG/ML (ref 30–80)

## 2021-12-03 ENCOUNTER — HOSPITAL ENCOUNTER (EMERGENCY)
Facility: MEDICAL CENTER | Age: 46
End: 2021-12-03
Attending: EMERGENCY MEDICINE
Payer: COMMERCIAL

## 2021-12-03 ENCOUNTER — APPOINTMENT (OUTPATIENT)
Dept: RADIOLOGY | Facility: MEDICAL CENTER | Age: 46
End: 2021-12-03
Attending: EMERGENCY MEDICINE
Payer: COMMERCIAL

## 2021-12-03 ENCOUNTER — OFFICE VISIT (OUTPATIENT)
Dept: URGENT CARE | Facility: PHYSICIAN GROUP | Age: 46
End: 2021-12-03
Payer: COMMERCIAL

## 2021-12-03 VITALS
HEART RATE: 80 BPM | RESPIRATION RATE: 16 BRPM | DIASTOLIC BLOOD PRESSURE: 85 MMHG | SYSTOLIC BLOOD PRESSURE: 135 MMHG | BODY MASS INDEX: 23.07 KG/M2 | WEIGHT: 117.5 LBS | OXYGEN SATURATION: 96 % | HEIGHT: 60 IN | TEMPERATURE: 97.3 F

## 2021-12-03 VITALS
WEIGHT: 116 LBS | RESPIRATION RATE: 20 BRPM | HEART RATE: 90 BPM | TEMPERATURE: 96.7 F | BODY MASS INDEX: 22.78 KG/M2 | SYSTOLIC BLOOD PRESSURE: 132 MMHG | DIASTOLIC BLOOD PRESSURE: 90 MMHG | OXYGEN SATURATION: 98 % | HEIGHT: 60 IN

## 2021-12-03 DIAGNOSIS — R33.8 ACUTE URINARY RETENTION: ICD-10-CM

## 2021-12-03 DIAGNOSIS — N85.2 ENLARGED UTERUS: ICD-10-CM

## 2021-12-03 DIAGNOSIS — R33.9 URINARY RETENTION: ICD-10-CM

## 2021-12-03 LAB
ANION GAP SERPL CALC-SCNC: 16 MMOL/L (ref 7–16)
APPEARANCE UR: CLEAR
BASOPHILS # BLD AUTO: 0.4 % (ref 0–1.8)
BASOPHILS # BLD: 0.06 K/UL (ref 0–0.12)
BILIRUB UR QL STRIP.AUTO: NEGATIVE
BUN SERPL-MCNC: 10 MG/DL (ref 8–22)
CALCIUM SERPL-MCNC: 9.6 MG/DL (ref 8.5–10.5)
CHLORIDE SERPL-SCNC: 104 MMOL/L (ref 96–112)
CO2 SERPL-SCNC: 21 MMOL/L (ref 20–33)
COLOR UR: YELLOW
CREAT SERPL-MCNC: 0.69 MG/DL (ref 0.5–1.4)
EOSINOPHIL # BLD AUTO: 0.18 K/UL (ref 0–0.51)
EOSINOPHIL NFR BLD: 1.3 % (ref 0–6.9)
ERYTHROCYTE [DISTWIDTH] IN BLOOD BY AUTOMATED COUNT: 41.6 FL (ref 35.9–50)
GLUCOSE SERPL-MCNC: 104 MG/DL (ref 65–99)
GLUCOSE UR STRIP.AUTO-MCNC: NEGATIVE MG/DL
HCT VFR BLD AUTO: 44.5 % (ref 37–47)
HGB BLD-MCNC: 14.9 G/DL (ref 12–16)
IMM GRANULOCYTES # BLD AUTO: 0.07 K/UL (ref 0–0.11)
IMM GRANULOCYTES NFR BLD AUTO: 0.5 % (ref 0–0.9)
KETONES UR STRIP.AUTO-MCNC: NEGATIVE MG/DL
LEUKOCYTE ESTERASE UR QL STRIP.AUTO: NEGATIVE
LYMPHOCYTES # BLD AUTO: 1.3 K/UL (ref 1–4.8)
LYMPHOCYTES NFR BLD: 9.2 % (ref 22–41)
MCH RBC QN AUTO: 29.7 PG (ref 27–33)
MCHC RBC AUTO-ENTMCNC: 33.5 G/DL (ref 33.6–35)
MCV RBC AUTO: 88.6 FL (ref 81.4–97.8)
MICRO URNS: NORMAL
MONOCYTES # BLD AUTO: 0.71 K/UL (ref 0–0.85)
MONOCYTES NFR BLD AUTO: 5 % (ref 0–13.4)
NEUTROPHILS # BLD AUTO: 11.84 K/UL (ref 2–7.15)
NEUTROPHILS NFR BLD: 83.6 % (ref 44–72)
NITRITE UR QL STRIP.AUTO: NEGATIVE
NRBC # BLD AUTO: 0 K/UL
NRBC BLD-RTO: 0 /100 WBC
PH UR STRIP.AUTO: 6 [PH] (ref 5–8)
PLATELET # BLD AUTO: 304 K/UL (ref 164–446)
PMV BLD AUTO: 9.8 FL (ref 9–12.9)
POTASSIUM SERPL-SCNC: 3.4 MMOL/L (ref 3.6–5.5)
PROT UR QL STRIP: NEGATIVE MG/DL
RBC # BLD AUTO: 5.02 M/UL (ref 4.2–5.4)
RBC UR QL AUTO: NEGATIVE
SODIUM SERPL-SCNC: 141 MMOL/L (ref 135–145)
SP GR UR STRIP.AUTO: 1
UROBILINOGEN UR STRIP.AUTO-MCNC: 0.2 MG/DL
WBC # BLD AUTO: 14.2 K/UL (ref 4.8–10.8)

## 2021-12-03 PROCEDURE — 81003 URINALYSIS AUTO W/O SCOPE: CPT

## 2021-12-03 PROCEDURE — 51702 INSERT TEMP BLADDER CATH: CPT

## 2021-12-03 PROCEDURE — 99214 OFFICE O/P EST MOD 30 MIN: CPT | Performed by: EMERGENCY MEDICINE

## 2021-12-03 PROCEDURE — 74176 CT ABD & PELVIS W/O CONTRAST: CPT

## 2021-12-03 PROCEDURE — 80048 BASIC METABOLIC PNL TOTAL CA: CPT

## 2021-12-03 PROCEDURE — 99284 EMERGENCY DEPT VISIT MOD MDM: CPT

## 2021-12-03 PROCEDURE — 303105 HCHG CATHETER EXTRA

## 2021-12-03 PROCEDURE — 85025 COMPLETE CBC W/AUTO DIFF WBC: CPT

## 2021-12-03 ASSESSMENT — ENCOUNTER SYMPTOMS
DIARRHEA: 0
FEVER: 0
CHILLS: 0
CONSTIPATION: 0
CHANGE IN BOWEL HABIT: 0
ABDOMINAL PAIN: 1
VOMITING: 0
NAUSEA: 0
FLANK PAIN: 0

## 2021-12-03 ASSESSMENT — FIBROSIS 4 INDEX
FIB4 SCORE: 0.83
FIB4 SCORE: 0.83

## 2021-12-03 NOTE — PROGRESS NOTES
Subjective     Yeni Glasgow is a 46 y.o. female who presents with Other (Pt states that she cant void, the last time she did was at 1000. )            Other  This is a new problem. The current episode started today. Associated symptoms include abdominal pain and urinary symptoms. Pertinent negatives include no change in bowel habit, chills, fever, nausea or vomiting.   PMH urolithiasis.  Patient had renal ultrasound today; prepped with increased oral fluid intake.  Subsequent to ultrasound was able to void a small amount twice; urine appeared normal but had some mild dysuria.  Now unable to void for over 5 hours.    Review of Systems   Constitutional: Negative for chills and fever.   Gastrointestinal: Positive for abdominal pain. Negative for change in bowel habit, constipation, diarrhea, nausea and vomiting.   Genitourinary: Positive for dysuria and urgency. Negative for flank pain and hematuria.        No vaginal discharge or bleeding.              Objective     /90 (BP Location: Left arm, Patient Position: Sitting, BP Cuff Size: Adult)   Pulse 90   Temp 35.9 °C (96.7 °F) (Temporal)   Resp 20   Ht 1.524 m (5')   Wt 52.6 kg (116 lb)   SpO2 98%   BMI 22.65 kg/m²      Physical Exam  Constitutional:       General: She is not in acute distress.     Appearance: She is well-developed. She is not ill-appearing.   Cardiovascular:      Rate and Rhythm: Normal rate and regular rhythm.      Heart sounds: Normal heart sounds.   Pulmonary:      Effort: Pulmonary effort is normal.      Breath sounds: Normal breath sounds.   Abdominal:      General: There is distension.      Palpations: There is mass. There is no pulsatile mass.      Tenderness: There is abdominal tenderness in the suprapubic area. There is no right CVA tenderness or left CVA tenderness.       Skin:     General: Skin is warm and dry.   Neurological:      Mental Status: She is alert.   Psychiatric:         Behavior: Behavior is cooperative.                          Offered analgesia, patient declined.    Assessment & Plan        1. Acute urinary retention  Advised patient and  of need for ED evaluation and management; they agreed.  Patient stable enough to transfer by private vehicle,  will drive.  [facility] transfer center provided telephone report.

## 2021-12-04 NOTE — ED NOTES
Catheter drainage back switched to leg bag. Patient provided education regarding use of leg bag and proper hand hygiene. Patient provided discharge instructions. Patient verbalized understanding. Patient leaving ER in stable condition. Patient ambulatory with steady gait. Wristband and IV removed.

## 2021-12-04 NOTE — ED PROVIDER NOTES
ED Provider Note        Primary care provider: Pcp Pt States None    I verified that the patient was wearing a mask and I was wearing appropriate PPE every time I entered the room. The patient's mask was on the patient at all times during my encounter except for a brief view of the oropharynx.      CHIEF COMPLAINT  Chief Complaint   Patient presents with   • Unable to Urinate     since 10am       HPI  Yeni Glasgow is a 46 y.o. female who presents to the Emergency Department with chief complaint ofurinary retention.  Patient has been unable to urinate at all since 10 AM this morning but went to an urgent treatment center and was advised to come here for further evaluation and treatment.  Bladder scan in triage showed 656 mL was retention.  Patient denies ever having history of this but she does state that she has a history of recurrent nephrolithiasis and thinks that she might of had a kidney stone that is blocking her urethra.  She reports no other recent symptoms she has had no fevers no chills no nausea no vomiting no recent flank pain no urinary hesitancy urgency and days prior she has minor nausea right now and moderate pain in the suprapubic region without modifying factors.    REVIEW OF SYSTEMS  10 systems reviewed and otherwise negative, pertinent positives and negatives listed in the history of present illness.    PAST MEDICAL HISTORY   Kidney stones    SURGICAL HISTORY   has a past surgical history that includes tubal coagulation laparoscopic bilateral (2003 lb ).    SOCIAL HISTORY  Social History     Tobacco Use   • Smoking status: Never Smoker   • Smokeless tobacco: Never Used   Vaping Use   • Vaping Use: Never used   Substance Use Topics   • Alcohol use: No   • Drug use: No      Social History     Substance and Sexual Activity   Drug Use No       FAMILY HISTORY  Non-Contributory    CURRENT MEDICATIONS  Home Medications    **Home medications have not yet been reviewed for this encounter**          ALLERGIES  No Known Allergies    PHYSICAL EXAM  VITAL SIGNS: /97   Pulse 87   Temp 36.3 °C (97.4 °F) (Temporal)   Resp 16   Ht 1.524 m (5')   Wt 53.3 kg (117 lb 8.1 oz)   SpO2 94%   BMI 22.95 kg/m²   Pulse ox interpretation: I interpret this pulse ox as normal.  Constitutional: Alert and oriented x 3, Distress  HEENT: Atraumatic normocephalic, pupils are equal round, extraocular movements are intact. The nares is clear, external ears are normal, mouth shows moist mucous membranes  Neck: no obvious JVD or tracheal deviation  Cardiovascular: Regular rate and rhythm no murmur rub or gallop   Thorax & Lungs: No respiratory distress, no wheezes rales or rhonchi, No chest tenderness.   GI: Minimal tenderness in the suprapubic region minimally distended no rebound or guarding positive bowel sounds nondistended  Skin: Warm dry no obvious acute rash or lesion  Musculoskeletal: Moving all extremities with normal range strength, no acute  deformity  Neurologic: Cranial nerves III through XII are grossly intact, no sensory deficit, no cerebellar dysfunction   Psychiatric: Appropriate affect for situation at this time      DIAGNOSTIC STUDIES / PROCEDURES  LABS      Results for orders placed or performed during the hospital encounter of 12/03/21   CBC WITH DIFFERENTIAL   Result Value Ref Range    WBC 14.2 (H) 4.8 - 10.8 K/uL    RBC 5.02 4.20 - 5.40 M/uL    Hemoglobin 14.9 12.0 - 16.0 g/dL    Hematocrit 44.5 37.0 - 47.0 %    MCV 88.6 81.4 - 97.8 fL    MCH 29.7 27.0 - 33.0 pg    MCHC 33.5 (L) 33.6 - 35.0 g/dL    RDW 41.6 35.9 - 50.0 fL    Platelet Count 304 164 - 446 K/uL    MPV 9.8 9.0 - 12.9 fL    Neutrophils-Polys 83.60 (H) 44.00 - 72.00 %    Lymphocytes 9.20 (L) 22.00 - 41.00 %    Monocytes 5.00 0.00 - 13.40 %    Eosinophils 1.30 0.00 - 6.90 %    Basophils 0.40 0.00 - 1.80 %    Immature Granulocytes 0.50 0.00 - 0.90 %    Nucleated RBC 0.00 /100 WBC    Neutrophils (Absolute) 11.84 (H) 2.00 - 7.15 K/uL     Lymphs (Absolute) 1.30 1.00 - 4.80 K/uL    Monos (Absolute) 0.71 0.00 - 0.85 K/uL    Eos (Absolute) 0.18 0.00 - 0.51 K/uL    Baso (Absolute) 0.06 0.00 - 0.12 K/uL    Immature Granulocytes (abs) 0.07 0.00 - 0.11 K/uL    NRBC (Absolute) 0.00 K/uL   BASIC METABOLIC PANEL   Result Value Ref Range    Sodium 141 135 - 145 mmol/L    Potassium 3.4 (L) 3.6 - 5.5 mmol/L    Chloride 104 96 - 112 mmol/L    Co2 21 20 - 33 mmol/L    Glucose 104 (H) 65 - 99 mg/dL    Bun 10 8 - 22 mg/dL    Creatinine 0.69 0.50 - 1.40 mg/dL    Calcium 9.6 8.5 - 10.5 mg/dL    Anion Gap 16.0 7.0 - 16.0   URINALYSIS CULTURE, IF INDICATED    Specimen: Urine   Result Value Ref Range    Color Yellow     Character Clear     Specific Gravity 1.005 <1.035    Ph 6.0 5.0 - 8.0    Glucose Negative Negative mg/dL    Ketones Negative Negative mg/dL    Protein Negative Negative mg/dL    Bilirubin Negative Negative    Urobilinogen, Urine 0.2 Negative    Nitrite Negative Negative    Leukocyte Esterase Negative Negative    Occult Blood Negative Negative    Micro Urine Req see below    ESTIMATED GFR   Result Value Ref Range    GFR If African American >60 >60 mL/min/1.73 m 2    GFR If Non African American >60 >60 mL/min/1.73 m 2       All labs reviewed by me.      RADIOLOGY  CT-RENAL COLIC EVALUATION(A/P W/O)   Final Result         1.  Mild left hydronephrosis and dilatation of the majority of the left ureter. No stone identified in the proximal and mid left ureter.      2.  The distal left ureter is not identified secondary to an enlarged uterus. A distal left ureteral stone cannot be excluded.      3.  Multiple variably sized left renal stones are present. The largest stone is in the lower pole measuring 3.4 mm.      4.  Single 3 mm stone in the lower pole the right kidney. No right hydronephrosis or right ureteral dilatation.      5.  Enlarged uterus measuring 10 x 9 x 8 cm with anterior and right-sided displacement of the bladder.            COURSE & MEDICAL  DECISION MAKING  Pertinent Labs & Imaging studies reviewed. (See chart for details)    5:19 PM - Patient seen and examined at bedside.     Patient noted to have slightly elevated blood pressure likely circumstantial secondary to presenting complaint. Referred to primary care physician for further evaluation.    Medical Decision Making: Jimenez placed in patient had near immediate relief of symptoms.  CT was ordered which does not demonstrate an obstructive uropathy however there is a enlargement of the uterus with displacement of the left ureter that obscures the distal left ureter from full evaluation.  Patient has followed with  from gynecology and she had an endometrial biopsy done approximately 3 years ago she also has an appointment to see the nurse practitioner from urology [place] within the next month.  At this time I do not think it is appropriate to remove the Jimenez catheter as this puts her at high risk of recurrence of the acute urinary retention.  Her lab work is unremarkable with the exception of slight leukocytosis she has no fever no tachycardia urine is noninfected patient is given a leg bag she is instructed to follow-up with both gynecology and urology at the next available times to return here for further pain fevers chills nausea vomiting any other acute symptoms or concerns.  Given instructions return here in 4 to 5 days should she not be able to follow-up with urology for Jimenez catheter removal.  Discharged in stable and improved condition.    /97   Pulse 87   Temp 36.3 °C (97.4 °F) (Temporal)   Resp 16   Ht 1.524 m (5')   Wt 53.3 kg (117 lb 8.1 oz)   SpO2 94%   BMI 22.95 kg/m²     Miles Kebede M.D.  [address]  [facility] [id number] [phone]    Schedule an appointment as soon as possible for a visit       Melvi Barksdale M.D.  [address] [facility] [address]  [facility] [id number] [phone]    Schedule an appointment as soon as possible for a visit       [facility]  [place], Emergency Dept  [facility] [facility] [id number] [phone]    in 12-24 hours if symptoms persist, immediately If symptoms worsen, or if you develop any other symptoms or concerns        FINAL IMPRESSION  1. Urinary retention Active   2. Enlarged uterus Active   3.  Acute urinary retention  4.  Leukocytosis      This dictation has been created using voice recognition software and/or scribes. The accuracy of the dictation is limited by the abilities of the software and the expertise of the scribes. I expect there may be some errors of grammar and possibly content. I made every attempt to manually correct the errors within my dictation. However, errors related to voice recognition software and/or scribes may still exist and should be interpreted within the appropriate context.

## 2021-12-04 NOTE — ED NOTES
Chief Complaint   Patient presents with   • Unable to Urinate     since 10am     Pt ambulated to triage c/o unable to urinate since 10am, she had US earlier around 8am and urinated after.  Paged for bladder scan

## 2024-01-22 ENCOUNTER — OFFICE VISIT (OUTPATIENT)
Dept: URGENT CARE | Facility: PHYSICIAN GROUP | Age: 49
End: 2024-01-22
Payer: COMMERCIAL

## 2024-01-22 VITALS
RESPIRATION RATE: 16 BRPM | OXYGEN SATURATION: 95 % | DIASTOLIC BLOOD PRESSURE: 80 MMHG | WEIGHT: 121.25 LBS | TEMPERATURE: 98.2 F | SYSTOLIC BLOOD PRESSURE: 124 MMHG | HEIGHT: 60 IN | BODY MASS INDEX: 23.81 KG/M2 | HEART RATE: 90 BPM

## 2024-01-22 DIAGNOSIS — J02.9 PHARYNGITIS, UNSPECIFIED ETIOLOGY: ICD-10-CM

## 2024-01-22 LAB — S PYO DNA SPEC NAA+PROBE: NOT DETECTED

## 2024-01-22 PROCEDURE — 87651 STREP A DNA AMP PROBE: CPT | Performed by: PHYSICIAN ASSISTANT

## 2024-01-22 PROCEDURE — 3079F DIAST BP 80-89 MM HG: CPT | Performed by: PHYSICIAN ASSISTANT

## 2024-01-22 PROCEDURE — 99213 OFFICE O/P EST LOW 20 MIN: CPT | Performed by: PHYSICIAN ASSISTANT

## 2024-01-22 PROCEDURE — 3074F SYST BP LT 130 MM HG: CPT | Performed by: PHYSICIAN ASSISTANT

## 2024-01-22 ASSESSMENT — FIBROSIS 4 INDEX: FIB4 SCORE: 0.37

## 2024-01-22 ASSESSMENT — ENCOUNTER SYMPTOMS
SINUS PAIN: 0
SORE THROAT: 1
PALPITATIONS: 0
DIARRHEA: 0
DIZZINESS: 0
VOMITING: 0
ABDOMINAL PAIN: 0
SPUTUM PRODUCTION: 0
FEVER: 0
MYALGIAS: 0
WHEEZING: 0
SHORTNESS OF BREATH: 0
HEADACHES: 0
CHILLS: 0
DIAPHORESIS: 0
NAUSEA: 0
COUGH: 1

## 2024-01-22 NOTE — PROGRESS NOTES
Subjective:     CHIEF COMPLAINT  Chief Complaint   Patient presents with    Sore Throat     X 4 days       HPI  Yeni Glasgow is a very pleasant 48 y.o. female who presents to the clinic with sore throat and cough x 4 days.  States sore throat was initially painful at this time and is more dry and scratchy which causes the patient to cough.  No difficulty swallowing or handling secretions.  Believes she may have been running a fever over the first 24 hours this has since subsided.  No body aches or chills.  Took an at-home COVID test that returned negative.  No known ill contacts.  Currently taking OTC cough medication as needed.    REVIEW OF SYSTEMS  Review of Systems   Constitutional:  Negative for chills, diaphoresis, fever and malaise/fatigue.   HENT:  Positive for sore throat. Negative for congestion, ear pain and sinus pain.    Respiratory:  Positive for cough. Negative for sputum production, shortness of breath and wheezing.    Cardiovascular:  Negative for chest pain and palpitations.   Gastrointestinal:  Negative for abdominal pain, diarrhea, nausea and vomiting.   Musculoskeletal:  Negative for myalgias.   Neurological:  Negative for dizziness and headaches.       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    ALLERGIES  No Known Allergies    CURRENT MEDICATIONS  Home Medications       Reviewed by Raz Callaway P.A.-C. (Physician Assistant) on 01/22/24 at 0942  Med List Status: <None>      Medication Last Dose Status   atorvastatin (LIPITOR) 10 MG Tab Taking Active   losartan (COZAAR) 25 MG Tab Taking Active   Misc Natural Products (FOCUSED MIND PO)  Active   omeprazole (PRILOSEC) 20 MG delayed-release capsule Taking Active                    SOCIAL HISTORY  Social History     Tobacco Use    Smoking status: Never    Smokeless tobacco: Never   Vaping Use    Vaping Use: Never used   Substance and Sexual Activity    Alcohol use: No    Drug use: No    Sexual activity: Yes     Partners: Male       FAMILY HISTORY  Family History   Problem Relation Age of Onset    Cancer Neg Hx           Objective:     VITAL SIGNS: /80 (BP Location: Right arm, Patient Position: Sitting, BP Cuff Size: Adult)   Pulse 90   Temp 36.8 °C (98.2 °F) (Temporal)   Resp 16   Ht 1.524 m (5')   Wt 55 kg (121 lb 4.1 oz)   SpO2 95%   BMI 23.68 kg/m²     PHYSICAL EXAM  Physical Exam  Constitutional:       General: She is not in acute distress.     Appearance: Normal appearance. She is not ill-appearing, toxic-appearing or diaphoretic.   HENT:      Head: Normocephalic and atraumatic.      Right Ear: Tympanic membrane, ear canal and external ear normal.      Left Ear: Tympanic membrane, ear canal and external ear normal.      Nose: Congestion present. No rhinorrhea.      Mouth/Throat:      Mouth: Mucous membranes are moist.      Pharynx: Posterior oropharyngeal erythema present. No oropharyngeal exudate.   Eyes:      Conjunctiva/sclera: Conjunctivae normal.   Cardiovascular:      Rate and Rhythm: Normal rate and regular rhythm.      Pulses: Normal pulses.      Heart sounds: Normal heart sounds.   Pulmonary:      Effort: Pulmonary effort is normal.      Breath sounds: Normal breath sounds. No wheezing, rhonchi or rales.   Musculoskeletal:      Cervical back: Normal range of motion. No muscular tenderness.   Lymphadenopathy:      Cervical: No cervical adenopathy.   Skin:     General: Skin is warm and dry.   Neurological:       Mental Status: She is alert.   Psychiatric:         Mood and Affect: Mood normal.         Thought Content: Thought content normal.       Lab Results/POC Test Results   Results for orders placed or performed in visit on 01/22/24   POCT GROUP A STREP, PCR   Result Value Ref Range    POC Group A Strep, PCR Not Detected Not Detected, Invalid             Assessment/Plan:     1. Pharyngitis, unspecified etiology  - POCT GROUP A STREP, PCR      MDM/Comments:    -Discussed viral etiology of URI.     Symptomatic care.  -Oral hydration and rest.   -Over the counter expectorant as directed; Guaifenesin (Mucinex).  -Diphenhydramine as directed for rhinorrhea (runny nose) and sneezing.  --May take over the counter pseudoephedrine for nasal congestion. Can increase your blood pressure.  -Tylenol or ibuprofen for pain and fever as directed.   -Saline nasal spray as a decongestant.    Follow up for shortness of breath, fevers, elevated heart rate, weakness, prolonged cough, chest pain, or any other concerns.    Differential diagnosis, natural history, supportive care, and indications for immediate follow-up discussed. All questions answered. Patient agrees with the plan of care.    Follow-up as needed if symptoms worsen or fail to improve to PCP, Urgent care or Emergency Room.    I have personally reviewed prior external notes and test results pertinent to today's visit.  I have independently reviewed and interpreted all diagnostics ordered during this urgent care acute visit.   Discussed management options (risks,benefits, and alternatives to treatment). Pt expresses understanding and the treatment plan was agreed upon. Questions were encouraged and answered to pt's satisfaction.    Please note that this dictation was created using voice recognition software. I have made a reasonable attempt to correct obvious errors, but I expect that there are errors of grammar and possibly content that I did not discover before finalizing the  note.

## 2024-08-15 NOTE — TELEPHONE ENCOUNTER
Nuc stress tomorrow  NPO mn    8/13/24 -  nuclear stress test neg for ischemia.  add plavix for demand ischemia.        Was the patient seen in the last year in this department? Yes    Does patient have an active prescription for medications requested? No     Received Request Via: Pharmacy     This medication is discontinued.

## 2024-09-12 ENCOUNTER — APPOINTMENT (RX ONLY)
Dept: URBAN - METROPOLITAN AREA CLINIC 4 | Facility: CLINIC | Age: 49
Setting detail: DERMATOLOGY
End: 2024-09-12

## 2024-09-12 DIAGNOSIS — D22 MELANOCYTIC NEVI: ICD-10-CM

## 2024-09-12 PROBLEM — D22.39 MELANOCYTIC NEVI OF OTHER PARTS OF FACE: Status: ACTIVE | Noted: 2024-09-12

## 2024-09-12 PROCEDURE — ? ADDITIONAL NOTES

## 2024-09-12 PROCEDURE — ? PHOTO-DOCUMENTATION

## 2024-09-12 PROCEDURE — 99202 OFFICE O/P NEW SF 15 MIN: CPT

## 2024-09-12 PROCEDURE — ? COUNSELING

## 2024-09-12 ASSESSMENT — LOCATION SIMPLE DESCRIPTION DERM: LOCATION SIMPLE: RIGHT CHEEK

## 2024-09-12 ASSESSMENT — LOCATION DETAILED DESCRIPTION DERM: LOCATION DETAILED: RIGHT SUPERIOR MEDIAL BUCCAL CHEEK

## 2024-09-12 ASSESSMENT — LOCATION ZONE DERM: LOCATION ZONE: FACE

## 2024-09-12 NOTE — PROCEDURE: ADDITIONAL NOTES
Detail Level: Simple
Render Risk Assessment In Note?: no
Additional Notes: Pt states she has had this removed before. She would like to have it removed we will send her to Dr. Sanders or Dayan cristina. She will think about it and contact the office.

## 2025-03-25 ENCOUNTER — NON-PROVIDER VISIT (OUTPATIENT)
Dept: OCCUPATIONAL MEDICINE | Facility: CLINIC | Age: 50
End: 2025-03-25

## 2025-03-25 DIAGNOSIS — Z11.1 ENCOUNTER FOR PPD TEST: ICD-10-CM

## 2025-03-25 PROCEDURE — 86580 TB INTRADERMAL TEST: CPT | Performed by: NURSE PRACTITIONER

## 2025-03-28 ENCOUNTER — NON-PROVIDER VISIT (OUTPATIENT)
Dept: OCCUPATIONAL MEDICINE | Facility: CLINIC | Age: 50
End: 2025-03-28

## 2025-03-28 LAB — TB WHEAL 3D P 5 TU DIAM: NORMAL MM

## 2025-03-28 NOTE — PROGRESS NOTES
Yeni Glasgow is a 49 y.o. female here for a non-provider visit for PPD reading -- Step 1 of 1.      1.  Resulted in Epic under enter/edit results? Yes   2.  TB evaluation questionnaire scanned into chart and original given to patient?Yes      3. Was induration greater than 0 mm? No.        Routed to PCP? No